# Patient Record
Sex: FEMALE | Race: WHITE | Employment: FULL TIME | URBAN - METROPOLITAN AREA
[De-identification: names, ages, dates, MRNs, and addresses within clinical notes are randomized per-mention and may not be internally consistent; named-entity substitution may affect disease eponyms.]

---

## 2017-01-02 ENCOUNTER — HOSPITAL ENCOUNTER (EMERGENCY)
Facility: CLINIC | Age: 52
Discharge: HOME | End: 2017-01-02
Attending: FAMILY MEDICINE | Admitting: FAMILY MEDICINE
Payer: COMMERCIAL

## 2017-01-02 VITALS
RESPIRATION RATE: 20 BRPM | WEIGHT: 222.4 LBS | BODY MASS INDEX: 38.49 KG/M2 | TEMPERATURE: 97 F | DIASTOLIC BLOOD PRESSURE: 100 MMHG | SYSTOLIC BLOOD PRESSURE: 170 MMHG | HEART RATE: 84 BPM | OXYGEN SATURATION: 99 %

## 2017-01-02 DIAGNOSIS — L03.113 CELLULITIS OF HAND, RIGHT: ICD-10-CM
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
## 2017-01-02 DIAGNOSIS — S61.451A CAT BITE OF HAND, RIGHT, INITIAL ENCOUNTER: ICD-10-CM

## 2017-01-02 PROCEDURE — 25000125 ZZHC RX 250: Performed by: FAMILY MEDICINE

## 2017-01-02 PROCEDURE — 96365 THER/PROPH/DIAG IV INF INIT: CPT

## 2017-01-02 PROCEDURE — 99284 EMERGENCY DEPT VISIT MOD MDM: CPT | Performed by: FAMILY MEDICINE

## 2017-01-02 PROCEDURE — 99284 EMERGENCY DEPT VISIT MOD MDM: CPT | Mod: 25

## 2017-01-02 RX ORDER — LIDOCAINE 40 MG/G
CREAM TOPICAL
Status: DISCONTINUED | OUTPATIENT
Start: 2017-01-02 | End: 2017-01-02 | Stop reason: HOSPADM

## 2017-01-02 RX ORDER — AMPICILLIN AND SULBACTAM 2; 1 G/1; G/1
3 INJECTION, POWDER, FOR SOLUTION INTRAMUSCULAR; INTRAVENOUS ONCE
Status: COMPLETED | OUTPATIENT
Start: 2017-01-02 | End: 2017-01-02

## 2017-01-02 RX ORDER — NAPROXEN SODIUM 220 MG
220 TABLET ORAL 3 TIMES DAILY PRN
Refills: 0 | COMMUNITY
Start: 2017-01-02 | End: 2017-01-06

## 2017-01-02 RX ADMIN — AMPICILLIN SODIUM AND SULBACTAM SODIUM 3 G: 2; 1 INJECTION, POWDER, FOR SOLUTION INTRAMUSCULAR; INTRAVENOUS at 11:11

## 2017-01-02 NOTE — ED PROVIDER NOTES
History     Chief Complaint   Patient presents with     Cat Bite     The history is provided by the patient.     Chandni Javier is a 51 year old female who presents to the ED with a cat bite. Patient was over at her mother's house when she saw there was stool on the bathroom floor from one of the cats. When she went to wipe it up her mom's cat attacked her right hand/arm. This occurred yesterday around 1100. This morning she woke up with redness and swelling to the area. The area is warm to the touch as well. The cat is up to date on his shots and is being observed in her mother's home.     Patient Active Problem List   Diagnosis     CARDIOVASCULAR SCREENING; LDL GOAL LESS THAN 130     Sepsis (H)     E. coli UTI     Ureteral stone with hydronephrosis - right, mild hydro     Acute renal failure (H)     Diabetes mellitus, type 2 (H)     Intermittent asthma (MILD)     Irritable bowel syndrome     Sepsis (H)     Acute pyelonephritis     Anemia     Past Medical History   Diagnosis Date     Migraines      GERD (gastroesophageal reflux disease)      Other and unspecified hyperlipidemia      Uncomplicated asthma      Diabetes (H)      Kidney stones        Past Surgical History   Procedure Laterality Date     Combined cystoscopy, retrogrades, ureteroscopy, insert stent  6/23/2014     Procedure: COMBINED CYSTOSCOPY, RETROGRADES, URETEROSCOPY, INSERT STENT;  Surgeon: Austin Landon MD;  Location: [facility] OR       Family History   Problem Relation Age of Onset     Breast Cancer Mother      HEART DISEASE Father      Lipids Father      Hypertension Father      Genitourinary Problems Brother      kidney stone       Social History   Substance Use Topics     Smoking status: Never Smoker      Smokeless tobacco: Never Used     Alcohol Use: No        Immunization History   Administered Date(s) Administered     TD (ADULT, 7+) 03/08/2002          Allergies   Allergen Reactions     Decadron [Dexamethasone] Rash     Versed  [Midazolam] Rash     Zofran [Ondansetron] Rash       Current Outpatient Prescriptions   Medication Sig Dispense Refill     VITAMIN E COMPLEX PO Take 1,000 Units by mouth daily       VITAMIN D, CHOLECALCIFEROL, PO Take 400 Units by mouth       METFORMIN HCL PO Take 500 mg by mouth 2 times daily (with meals)       acetaminophen (TYLENOL) 500 MG tablet Take 1-2 tablets (500-1,000 mg) by mouth every 6 hours as needed       calcium carbonate (TUMS) 500 MG chewable tablet Take 1 tablet (500 mg) by mouth 3 times daily as needed For heartburn 90 chew tab      ALBUTEROL INHALER 17GM Inhale 2 puffs into the lungs every 4 hours as needed For shortness of breath  0     pantoprazole (PROTONIX) 40 MG enteric coated tablet Take 1 tablet (40 mg) by mouth 2 times daily 30 tablet      ranitidine (ZANTAC) 150 MG tablet Take 1 tablet (150 mg) by mouth daily as needed for heartburn 60 tablet      Atorvastatin Calcium (LIPITOR PO) Take 40 mg by mouth daily. Indications: Type II A Hyperlipidemia       Ferrous Sulfate (IRON SUPPLEMENT PO) Take 650 mg by mouth At Bedtime        fexofenadine (ALLEGRA) 180 MG tablet Take 1 tablet by mouth daily as needed.       NASONEX 50 MCG/ACT NA SUSP AS NEEDED       I have reviewed the Medications, Allergies, Past Medical and Surgical History, and Social History in the Epic system.    Review of Systems   Musculoskeletal:        Positive for pain and swelling to right hand.    Skin:        Positive for redness to right hand. The area is warm to the touch.    All other systems reviewed and are negative.      Physical Exam   BP: (!) 163/102 mmHg  Heart Rate: 94  Temp: 97  F (36.1  C)  Resp: 20  Weight: 100.88 kg (222 lb 6.4 oz)  SpO2: 97 %  Physical Exam   Constitutional: She appears well-developed and well-nourished. No distress.   Musculoskeletal:        Right hand: She exhibits tenderness, laceration and swelling. She exhibits normal range of motion, no bony tenderness, normal capillary refill and no  deformity. Normal sensation noted. Normal strength noted.   Nursing note and vitals reviewed.                          ED Course   Procedures             Critical Care time:  none            Medications   ampicillin-sulbactam (UNASYN) 3 g vial to attach to  mL bag (not administered)   lidocaine 1 % 1 mL (not administered)   lidocaine (LMX4) kit (not administered)   sodium chloride (PF) 0.9% PF flush 3 mL (not administered)   sodium chloride (PF) 0.9% PF flush 3 mL (not administered)         Assessments & Plan (with Medical Decision Making)  Chandni Javier is a 51 year old female presenting with a cat bite to right hand that occurred yesterday morning around 1100.  She now has swelling, pain and redness to the area. She requests a initial IV antibiotic dose and then trial of oral medications. I think this is a reasonable request based on the exam. She was given a dose of Unasyn IV and started on Augmentin 875mg twice a day for 7 days. She agrees to be rechecked in 2 days in her clinic or to return to the ER should she have worsening symptoms. The area of inflammation was outlined with a skin marker.     I have reviewed the nursing notes.    I have reviewed the findings, diagnosis, plan and need for follow up with the patient.    Discharge Medication List as of 1/2/2017 12:15 PM      START taking these medications    Details   amoxicillin-clavulanate (AUGMENTIN) 875-125 MG per tablet Take 1 tablet by mouth 2 times daily for 7 days, Disp-14 tablet, R-0, E-Prescribe      naproxen sodium (ALEVE) 220 MG tablet Take 1 tablet (220 mg) by mouth 3 times daily as needed for moderate pain (take with food), R-0, OTC                I verbally discussed the findings of the evaluation today in the ER. I have verbally discussed with Chandni the suggested treatment(s) as described in the discharge instructions and handouts. I have prescribed the above listed medications and instructed her on appropriate use of these  medications.      I have verbally suggested she follow-up in her clinic or return to the ER for increased symptoms. See the follow-up recommendations documented  in the after visit summary in this visit's EPIC chart.      Final diagnoses:   Cellulitis of hand, right   Cat bite of hand, right, initial encounter   This document serves as a record of services personally performed by Bean Tompkins DO. It was created on their behalf by Lizbeth Mcfarland, a trained medical scribe. The creation of this record is based on the provider's personal observations and the statements of the patient. This document has been checked and approved by the attending provider.   Note: Chart documentation done in part with Dragon Voice Recognition software. Although reviewed after completion, some word and grammatical errors may remain.        1/2/2017   [facility] EMERGENCY DEPARTMENT      Bean Tompkins DO  01/02/17 7245

## 2017-01-02 NOTE — ED AVS SNAPSHOT
[facility] Emergency Department    [address] [place] [address]    Phone:  [phone]    Fax:  [phone]                                       Chandni Javier   MRN: [id number]    Department:  [facility] Emergency Department   Date of Visit:  1/2/2017           Patient Information     Date Of Birth          1965        Your diagnoses for this visit were:     Cellulitis of hand, right     Cat bite of hand, right, initial encounter        You were seen by Bean Tompkins DO.      Follow-up Information     Schedule an appointment as soon as possible for a visit with Rubens Calle    Specialty:  Pediatrics    Why:  for recheck in 2 days    Contact information:    [facility] [place]  [address]  [facility] [id number] [phone]          Follow up with [facility] Emergency Department.    Specialty:  EMERGENCY MEDICINE    Why:  If symptoms worsen    Contact information:    [address] [place] [address] [phone]    Additional information:    From [address]: Exit at [address] on south side of [place]. Turn right on [address]. Turn left at stoplight on [address]. [facility] will be in view two blocks ahead        Discharge Instructions       Please read and follow the handout(s) instructions. Return, if needed, for increased or worsening symptoms and as directed by the handout(s).    Yogurt orally twice a day while on the antibiotics may help prevent diarrhea and secondary infections caused by the antibiotic use.    Please make an appointment in your clinic for 2 days from now for recheck. Please return to the ER for increased symptoms of pain, redness, movement problems with the hand or fingers, fever above 102.    I hope you feel better soon!    Electronically signed, Bean Tompkins DO      Discharge References/Attachments     CAT BITE (ENGLISH)    SKIN INFECTION, CELLULITIS (ENGLISH)      24  Hour Appointment Hotline       To make an appointment at any [facility], call [phone] ([phone]). If you don't have a family doctor or clinic, we will help you find one. [facility] clinics are conveniently located to serve the needs of you and your family.             Review of your medicines      START taking        Dose / Directions Last dose taken    amoxicillin-clavulanate 875-125 MG per tablet   Commonly known as:  AUGMENTIN   Dose:  1 tablet   Quantity:  14 tablet        Take 1 tablet by mouth 2 times daily for 7 days   Refills:  0        naproxen sodium 220 MG tablet   Commonly known as:  ALEVE   Dose:  220 mg        Take 1 tablet (220 mg) by mouth 3 times daily as needed for moderate pain (take with food)   Refills:  0          Our records show that you are taking the medicines listed below. If these are incorrect, please call your family doctor or clinic.        Dose / Directions Last dose taken    acetaminophen 500 MG tablet   Commonly known as:  TYLENOL   Dose:  500-1000 mg        Take 1-2 tablets (500-1,000 mg) by mouth every 6 hours as needed   Refills:  0        ALBUTEROL INHALER 17GM   Dose:  2 puff        Inhale 2 puffs into the lungs every 4 hours as needed For shortness of breath   Refills:  0        calcium carbonate 500 MG chewable tablet   Commonly known as:  TUMS   Dose:  1 chew tab   Quantity:  90 chew tab        Take 1 tablet (500 mg) by mouth 3 times daily as needed For heartburn   Refills:  0        fexofenadine 180 MG tablet   Commonly known as:  ALLEGRA   Dose:  180 mg        Take 1 tablet by mouth daily as needed.   Refills:  0        IRON SUPPLEMENT PO   Dose:  650 mg        Take 650 mg by mouth At Bedtime   Refills:  0        LIPITOR PO   Dose:  40 mg   Indication:  Type II A Hyperlipidemia        Take 40 mg by mouth daily. Indications: Type II A Hyperlipidemia   Refills:  0        METFORMIN HCL PO   Dose:  500 mg        Take 500 mg by mouth 2 times daily (with meals)    Refills:  0        NASONEX 50 MCG/ACT spray   Generic drug:  mometasone        AS NEEDED   Refills:  0        PROTONIX 40 MG EC tablet   Dose:  40 mg   Quantity:  30 tablet   Generic drug:  pantoprazole        Take 1 tablet (40 mg) by mouth 2 times daily   Refills:  0        VITAMIN D (CHOLECALCIFEROL) PO   Dose:  400 Units        Take 400 Units by mouth   Refills:  0        VITAMIN E COMPLEX PO   Dose:  1000 Units        Take 1,000 Units by mouth daily   Refills:  0        ZANTAC 150 MG tablet   Dose:  150 mg   Quantity:  60 tablet   Generic drug:  ranitidine        Take 1 tablet (150 mg) by mouth daily as needed for heartburn   Refills:  0                Prescriptions were sent or printed at these locations (2 Prescriptions)                   [facility] [place], [place] - [address]    [address]  [place] [address]    Telephone:  [phone]   Fax:  [phone]   Hours:                  E-Prescribed (1 of 2)         amoxicillin-clavulanate (AUGMENTIN) 875-125 MG per tablet                 Not Printed or Sent (1 of 2)         naproxen sodium (ALEVE) 220 MG tablet                Orders Needing Specimen Collection     None      Pending Results     No orders found from 1/1/2017 to 1/3/2017.            Thank you for choosing [facility]       Thank you for choosing [facility] for your care. Our goal is always to provide you with excellent care. Hearing back from our patients is one way we can continue to improve our services. Please take a few minutes to complete the written survey that you may receive in the mail after you visit with us. Thank you!        [facility] Information     [employer] gives you secure access to your electronic health record. If you see a primary care provider, you can also send messages to your care team and make appointments. If you have questions, please call your primary care clinic.  If you do not have a primary care provider, please call [phone] and they will  assist you.        After Visit Summary       This is your record. Keep this with you and show to your community pharmacist(s) and doctor(s) at your next visit.

## 2017-01-02 NOTE — ED AVS SNAPSHOT
[facility] Emergency Department    [address] [place] [address]    Phone:  [phone]    Fax:  [phone]                                       Chandni Javier   MRN: [id number]    Department:  [facility] Emergency Department   Date of Visit:  1/2/2017           After Visit Summary Signature Page     I have received my discharge instructions, and my questions have been answered. I have discussed any challenges I see with this plan with the nurse or doctor.    ..........................................................................................................................................  Patient/Patient Representative Signature      ..........................................................................................................................................  Patient Representative Print Name and Relationship to Patient    ..................................................               ................................................  Date                                            Time    ..........................................................................................................................................  Reviewed by Signature/Title    ...................................................              ..............................................  Date                                                            Time

## 2017-01-02 NOTE — DISCHARGE INSTRUCTIONS
Please read and follow the handout(s) instructions. Return, if needed, for increased or worsening symptoms and as directed by the handout(s).    Yogurt orally twice a day while on the antibiotics may help prevent diarrhea and secondary infections caused by the antibiotic use.    Please make an appointment in your clinic for 2 days from now for recheck. Please return to the ER for increased symptoms of pain, redness, movement problems with the hand or fingers, fever above 102.    I hope you feel better soon!    Electronically signed, Bean Tompkins DO

## 2017-01-02 NOTE — ED NOTES
Comes in with a cat bite to the right hand, was bit yesterday at about 1100. Right hand red and swollen

## 2017-03-23 ENCOUNTER — HOSPITAL ENCOUNTER (EMERGENCY)
Facility: CLINIC | Age: 52
Discharge: HOME | End: 2017-03-23
Attending: FAMILY MEDICINE | Admitting: FAMILY MEDICINE
Payer: COMMERCIAL

## 2017-03-23 VITALS
SYSTOLIC BLOOD PRESSURE: 156 MMHG | RESPIRATION RATE: 20 BRPM | TEMPERATURE: 97 F | DIASTOLIC BLOOD PRESSURE: 89 MMHG | OXYGEN SATURATION: 99 % | HEART RATE: 108 BPM

## 2017-03-23 DIAGNOSIS — R68.89 INFLUENZA-LIKE SYMPTOMS: ICD-10-CM

## 2017-03-23 DIAGNOSIS — J98.01 BRONCHOSPASM: ICD-10-CM

## 2017-03-23 PROCEDURE — 99283 EMERGENCY DEPT VISIT LOW MDM: CPT | Performed by: FAMILY MEDICINE

## 2017-03-23 PROCEDURE — 99282 EMERGENCY DEPT VISIT SF MDM: CPT

## 2017-03-23 RX ORDER — OSELTAMIVIR PHOSPHATE 75 MG/1
75 CAPSULE ORAL 2 TIMES DAILY
Qty: 10 CAPSULE | Refills: 0 | Status: SHIPPED | OUTPATIENT
Start: 2017-03-23 | End: 2017-03-28

## 2017-03-23 RX ORDER — PREDNISONE 20 MG/1
TABLET ORAL
Qty: 7 TABLET | Refills: 0 | Status: ON HOLD | OUTPATIENT
Start: 2017-03-23 | End: 2018-06-12

## 2017-03-23 ASSESSMENT — ENCOUNTER SYMPTOMS
MYALGIAS: 1
FEVER: 0
ACTIVITY CHANGE: 1
CHEST TIGHTNESS: 1
WHEEZING: 1
CHILLS: 0
SHORTNESS OF BREATH: 1

## 2017-03-23 NOTE — DISCHARGE INSTRUCTIONS
Please read and follow the handout(s) instructions. Return, if needed, for increased or worsening symptoms and as directed by the handout(s).    Start the Prednisone course if you develop more severe wheezing that is not controlled with your inhaler.    I hope you feel better soon!    Electronically signed, Bean Tompkins DO

## 2017-03-23 NOTE — ED AVS SNAPSHOT
[facility] Emergency Department    [address] [place] [address]    Phone:  [phone]    Fax:  [phone]                                       Chandni Javier   MRN: [id number]    Department:  [facility] Emergency Department   Date of Visit:  3/23/2017           After Visit Summary Signature Page     I have received my discharge instructions, and my questions have been answered. I have discussed any challenges I see with this plan with the nurse or doctor.    ..........................................................................................................................................  Patient/Patient Representative Signature      ..........................................................................................................................................  Patient Representative Print Name and Relationship to Patient    ..................................................               ................................................  Date                                            Time    ..........................................................................................................................................  Reviewed by Signature/Title    ...................................................              ..............................................  Date                                                            Time

## 2017-03-23 NOTE — ED PROVIDER NOTES
History     Chief Complaint   Patient presents with     Flu Symptoms     HPI  Chandni Javier is a 51 year old female who presents to the ER with concerns about cough and nasal congestion symptoms. She states she was exposed to influenza has her daughter was diagnosed with influenza A on Tuesday this week. She states she is developed more harsh cough with some body aches and now is also having increased wheezing.  She used her albuterol inhaler through the night which did control the wheeze but she is concerned that she should initiate treatment for influenza sooner than later given her recent exposure.  She did receive the influenza immunization this fall.  She states her daughter was not immunized but will be this next year.      I have reviewed the Medications, Allergies, Past Medical and Surgical History, and Social History in the Epic system.  Patient Active Problem List   Diagnosis     CARDIOVASCULAR SCREENING; LDL GOAL LESS THAN 130     Sepsis (H)     E. coli UTI     Ureteral stone with hydronephrosis - right, mild hydro     Acute renal failure (H)     Diabetes mellitus, type 2 (H)     Intermittent asthma (MILD)     Irritable bowel syndrome     Sepsis (H)     Acute pyelonephritis     Anemia       Past Medical History:   Diagnosis Date     Diabetes (H)      GERD (gastroesophageal reflux disease)      Kidney stones      Migraines      Other and unspecified hyperlipidemia      Uncomplicated asthma         Past Surgical History:   Procedure Laterality Date     COMBINED CYSTOSCOPY, RETROGRADES, URETEROSCOPY, INSERT STENT  6/23/2014    Procedure: COMBINED CYSTOSCOPY, RETROGRADES, URETEROSCOPY, INSERT STENT;  Surgeon: Austin Landon MD;  Location:  OR       Family History   Problem Relation Age of Onset     Breast Cancer Mother      HEART DISEASE Father      Lipids Father      Hypertension Father      Genitourinary Problems Brother      kidney stone       Social History     Social History     Marital  status:      Spouse name: N/A     Number of children: N/A     Years of education: N/A     Occupational History     Not on file.     Social History Main Topics     Smoking status: Never Smoker     Smokeless tobacco: Never Used     Alcohol use No     Drug use: No     Sexual activity: Yes     Partners: Male     Other Topics Concern     Not on file     Social History Narrative       Current Outpatient Rx   Medication Sig Dispense Refill     oseltamivir (TAMIFLU) 75 MG capsule Take 1 capsule (75 mg) by mouth 2 times daily for 5 days 10 capsule 0     predniSONE (DELTASONE) 20 MG tablet 2 tabs daily for 2 days, then 1 tab daily for 3 days 7 tablet 0     VITAMIN E COMPLEX PO Take 1,000 Units by mouth daily       VITAMIN D, CHOLECALCIFEROL, PO Take 400 Units by mouth       METFORMIN HCL PO Take 500 mg by mouth 2 times daily (with meals)       acetaminophen (TYLENOL) 500 MG tablet Take 1-2 tablets (500-1,000 mg) by mouth every 6 hours as needed       calcium carbonate (TUMS) 500 MG chewable tablet Take 1 tablet (500 mg) by mouth 3 times daily as needed For heartburn 90 chew tab      ALBUTEROL INHALER 17GM Inhale 2 puffs into the lungs every 4 hours as needed For shortness of breath  0     pantoprazole (PROTONIX) 40 MG enteric coated tablet Take 1 tablet (40 mg) by mouth 2 times daily 30 tablet      ranitidine (ZANTAC) 150 MG tablet Take 1 tablet (150 mg) by mouth daily as needed for heartburn 60 tablet      Atorvastatin Calcium (LIPITOR PO) Take 40 mg by mouth daily. Indications: Type II A Hyperlipidemia       Ferrous Sulfate (IRON SUPPLEMENT PO) Take 650 mg by mouth At Bedtime        fexofenadine (ALLEGRA) 180 MG tablet Take 1 tablet by mouth daily as needed.       NASONEX 50 MCG/ACT NA SUSP AS NEEDED         Allergies   Allergen Reactions     Decadron [Dexamethasone] Rash     Versed [Midazolam] Rash     Zofran [Ondansetron] Rash       Immunization History   Administered Date(s) Administered     TD (ADULT, 7+)  03/08/2002     Review of Systems   Constitutional: Positive for activity change. Negative for chills and fever.   HENT: Positive for congestion.    Respiratory: Positive for chest tightness, shortness of breath (mild) and wheezing.    Musculoskeletal: Positive for myalgias.   All other systems reviewed and are negative.      Physical Exam   BP: 156/89  Pulse: 108  Temp: 97  F (36.1  C)  Resp: 20  SpO2: 99 %  Physical Exam   Constitutional: She is oriented to person, place, and time. She appears well-developed and well-nourished. She appears distressed (mild).   HENT:   Head: Normocephalic.   Eyes: Conjunctivae and EOM are normal. Pupils are equal, round, and reactive to light.   Neck: Normal range of motion. Neck supple.   Cardiovascular: Normal rate.    Pulmonary/Chest: Effort normal. No respiratory distress. She has wheezes. She has no rales. She exhibits no tenderness.   Musculoskeletal: Normal range of motion.   Neurological: She is alert and oriented to person, place, and time.   Skin: No rash noted.   Nursing note and vitals reviewed.      ED Course     ED Course     Procedures             Critical Care time:  none                 Assessments & Plan (with Medical Decision Making)  Given the above history I told her that we would just treat with Tamiflu to get it started sooner than later.  No influenza testing will be done today.  She is in agreement with this plan.  Encouraged to continue the albuterol inhaler use as needed for wheeze or cough.  If the wheezes not controlled with albuterol then to initiate the prednisone course.  She was instructed about the increased risks with influenza virus infections and also given handout instructions educating her on influenza and signs and symptoms of worsening condition and when to return to the emergency room.       I have reviewed the nursing notes.    I have reviewed the findings, diagnosis, plan and need for follow up with the patient.    New Prescriptions     OSELTAMIVIR (TAMIFLU) 75 MG CAPSULE    Take 1 capsule (75 mg) by mouth 2 times daily for 5 days    PREDNISONE (DELTASONE) 20 MG TABLET    2 tabs daily for 2 days, then 1 tab daily for 3 days          I verbally discussed the findings of the evaluation today in the ER. I have verbally discussed with Chandni the suggested treatment(s) as described in the discharge instructions and handouts. I have prescribed the above listed medications and instructed her on appropriate use of these medications.      I have verbally suggested she follow-up in her clinic or return to the ER for increased symptoms. See the follow-up recommendations documented  in the after visit summary in this visit's EPIC chart.      Final diagnoses:   Influenza-like symptoms   Bronchospasm       3/23/2017   [facility] EMERGENCY DEPARTMENT     Bean Tompkins DO  03/23/17 0731

## 2017-03-23 NOTE — ED AVS SNAPSHOT
[facility] Emergency Department    [address]     [place] [address]    Phone:  [phone]    Fax:  [phone]                                       Chandni Javier   MRN: [id number]    Department:  [facility] Emergency Department   Date of Visit:  3/23/2017           Patient Information     Date Of Birth          1965        Your diagnoses for this visit were:     Influenza-like symptoms     Bronchospasm        You were seen by Bean Tompkins DO.      Follow-up Information     Follow up with [facility]    Specialty:  Pediatrics    Why:  if not improved in 3-5 days    Contact information:    LISANDRO NICOLLET [place]  [address]  [facility] [id number] [phone]          Follow up with [facility] Emergency Department.    Specialty:  EMERGENCY MEDICINE    Why:  If symptoms worsen    Contact information:    [address]   [place] [address] [phone]    Additional information:    From [facility] [address]: Exit at [address] on south side of [place]. Turn right on [address]. Turn left at stoplight on [address]. [facility] will be in view two blocks ahead        Discharge Instructions       Please read and follow the handout(s) instructions. Return, if needed, for increased or worsening symptoms and as directed by the handout(s).    Start the Prednisone course if you develop more severe wheezing that is not controlled with your inhaler.    I hope you feel better soon!    Electronically signed, Bean Tompkins DO      Discharge References/Attachments     BRONCHOSPASM (ADULT) (ENGLISH)    INFLUENZA (ADULT) (ENGLISH)      24 Hour Appointment Hotline       To make an appointment at any [facility], call [phone] ([phone]). If you don't have a family doctor or clinic, we will help you find one. [facility] clinics are conveniently located to serve the needs of you and your family.             Review of  your medicines      START taking        Dose / Directions Last dose taken    oseltamivir 75 MG capsule   Commonly known as:  TAMIFLU   Dose:  75 mg   Quantity:  10 capsule        Take 1 capsule (75 mg) by mouth 2 times daily for 5 days   Refills:  0        predniSONE 20 MG tablet   Commonly known as:  DELTASONE   Quantity:  7 tablet        2 tabs daily for 2 days, then 1 tab daily for 3 days   Refills:  0          Our records show that you are taking the medicines listed below. If these are incorrect, please call your family doctor or clinic.        Dose / Directions Last dose taken    acetaminophen 500 MG tablet   Commonly known as:  TYLENOL   Dose:  500-1000 mg        Take 1-2 tablets (500-1,000 mg) by mouth every 6 hours as needed   Refills:  0        ALBUTEROL INHALER 17GM   Dose:  2 puff        Inhale 2 puffs into the lungs every 4 hours as needed For shortness of breath   Refills:  0        calcium carbonate 500 MG chewable tablet   Commonly known as:  TUMS   Dose:  1 chew tab   Quantity:  90 chew tab        Take 1 tablet (500 mg) by mouth 3 times daily as needed For heartburn   Refills:  0        fexofenadine 180 MG tablet   Commonly known as:  ALLEGRA   Dose:  180 mg        Take 1 tablet by mouth daily as needed.   Refills:  0        IRON SUPPLEMENT PO   Dose:  650 mg        Take 650 mg by mouth At Bedtime   Refills:  0        LIPITOR PO   Dose:  40 mg   Indication:  Type II A Hyperlipidemia        Take 40 mg by mouth daily. Indications: Type II A Hyperlipidemia   Refills:  0        METFORMIN HCL PO   Dose:  500 mg        Take 500 mg by mouth 2 times daily (with meals)   Refills:  0        NASONEX 50 MCG/ACT spray   Generic drug:  mometasone        AS NEEDED   Refills:  0        PROTONIX 40 MG EC tablet   Dose:  40 mg   Quantity:  30 tablet   Generic drug:  pantoprazole        Take 1 tablet (40 mg) by mouth 2 times daily   Refills:  0        VITAMIN D (CHOLECALCIFEROL) PO   Dose:  400 Units        Take 400  Units by mouth   Refills:  0        VITAMIN E COMPLEX PO   Dose:  1000 Units        Take 1,000 Units by mouth daily   Refills:  0        ZANTAC 150 MG tablet   Dose:  150 mg   Quantity:  60 tablet   Generic drug:  ranitidine        Take 1 tablet (150 mg) by mouth daily as needed for heartburn   Refills:  0                Prescriptions were sent or printed at these locations (2 Prescriptions)                   [facility]   [address] [address]    Telephone:  [phone]   Fax:  [phone]   Hours:                  Printed at Department/Unit printer (2 of 2)         oseltamivir (TAMIFLU) 75 MG capsule               predniSONE (DELTASONE) 20 MG tablet                Orders Needing Specimen Collection     None      Pending Results     No orders found from 3/21/2017 to 3/24/2017.            Pending Culture Results     No orders found from 3/21/2017 to 3/24/2017.            Thank you for choosing [facility]       Thank you for choosing [facility] for your care. Our goal is always to provide you with excellent care. Hearing back from our patients is one way we can continue to improve our services. Please take a few minutes to complete the written survey that you may receive in the mail after you visit with us. Thank you!        [employer] Information     [employer] gives you secure access to your electronic health record. If you see a primary care provider, you can also send messages to your care team and make appointments. If you have questions, please call your primary care clinic.  If you do not have a primary care provider, please call [phone] and they will assist you.        Care EveryWhere ID     This is your Care EveryWhere ID. This could be used by other organizations to access your [facility] medical records  [id number]        After Visit Summary       This is your record. Keep this with you and show to your community pharmacist(s) and doctor(s) at your next visit.

## 2017-10-08 ENCOUNTER — HEALTH MAINTENANCE LETTER (OUTPATIENT)
Age: 52
End: 2017-10-08

## 2018-06-12 ENCOUNTER — ANESTHESIA (OUTPATIENT)
Dept: SURGERY | Facility: CLINIC | Age: 53
End: 2018-06-12
Payer: COMMERCIAL

## 2018-06-12 ENCOUNTER — ANESTHESIA EVENT (OUTPATIENT)
Dept: SURGERY | Facility: CLINIC | Age: 53
End: 2018-06-12
Payer: COMMERCIAL

## 2018-06-12 ENCOUNTER — HOSPITAL ENCOUNTER (OUTPATIENT)
Facility: CLINIC | Age: 53
Discharge: HOME | End: 2018-06-12
Attending: FAMILY MEDICINE | Admitting: SPECIALIST
Payer: COMMERCIAL

## 2018-06-12 VITALS
TEMPERATURE: 97.6 F | DIASTOLIC BLOOD PRESSURE: 64 MMHG | SYSTOLIC BLOOD PRESSURE: 116 MMHG | BODY MASS INDEX: 37.9 KG/M2 | WEIGHT: 222 LBS | HEART RATE: 79 BPM | OXYGEN SATURATION: 98 % | RESPIRATION RATE: 14 BRPM | HEIGHT: 64 IN

## 2018-06-12 DIAGNOSIS — K61.1 PERIRECTAL ABSCESS: ICD-10-CM

## 2018-06-12 DIAGNOSIS — Z87.448 HISTORY OF PYELONEPHRITIS: ICD-10-CM

## 2018-06-12 DIAGNOSIS — N39.0 URINARY TRACT INFECTION WITHOUT HEMATURIA, SITE UNSPECIFIED: ICD-10-CM

## 2018-06-12 DIAGNOSIS — G89.18 POST-OP PAIN: Primary | ICD-10-CM

## 2018-06-12 DIAGNOSIS — E11.9 TYPE 2 DIABETES MELLITUS WITHOUT COMPLICATION, WITHOUT LONG-TERM CURRENT USE OF INSULIN (H): ICD-10-CM

## 2018-06-12 DIAGNOSIS — Z87.442 HISTORY OF KIDNEY STONES: ICD-10-CM

## 2018-06-12 DIAGNOSIS — Z86.19 HISTORY OF SEPSIS: ICD-10-CM

## 2018-06-12 LAB
ALBUMIN SERPL-MCNC: 3.6 G/DL (ref 3.4–5)
ALBUMIN UR-MCNC: 30 MG/DL
ALP SERPL-CCNC: 81 U/L (ref 40–150)
ALT SERPL W P-5'-P-CCNC: 45 U/L (ref 0–50)
ANION GAP SERPL CALCULATED.3IONS-SCNC: 9 MMOL/L (ref 3–14)
APPEARANCE UR: ABNORMAL
AST SERPL W P-5'-P-CCNC: 27 U/L (ref 0–45)
BACTERIA #/AREA URNS HPF: ABNORMAL /HPF
BASE EXCESS BLDV CALC-SCNC: 0.1 MMOL/L
BASOPHILS # BLD AUTO: 0 10E9/L (ref 0–0.2)
BASOPHILS NFR BLD AUTO: 0.4 %
BILIRUB SERPL-MCNC: 0.4 MG/DL (ref 0.2–1.3)
BILIRUB UR QL STRIP: ABNORMAL
BUN SERPL-MCNC: 14 MG/DL (ref 7–30)
CALCIUM SERPL-MCNC: 9 MG/DL (ref 8.5–10.1)
CHLORIDE SERPL-SCNC: 110 MMOL/L (ref 94–109)
CO2 SERPL-SCNC: 24 MMOL/L (ref 20–32)
COLOR UR AUTO: YELLOW
CREAT SERPL-MCNC: 0.79 MG/DL (ref 0.52–1.04)
CRP SERPL-MCNC: 64.8 MG/L (ref 0–8)
DIFFERENTIAL METHOD BLD: ABNORMAL
EOSINOPHIL NFR BLD AUTO: 3.3 %
ERYTHROCYTE [DISTWIDTH] IN BLOOD BY AUTOMATED COUNT: 12.4 % (ref 10–15)
GFR SERPL CREATININE-BSD FRML MDRD: 77 ML/MIN/1.7M2
GLUCOSE BLDC GLUCOMTR-MCNC: 102 MG/DL (ref 70–99)
GLUCOSE SERPL-MCNC: 108 MG/DL (ref 70–99)
GLUCOSE UR STRIP-MCNC: NEGATIVE MG/DL
HCO3 BLDV-SCNC: 25 MMOL/L (ref 21–28)
HCT VFR BLD AUTO: 38.4 % (ref 35–47)
HGB BLD-MCNC: 12.8 G/DL (ref 11.7–15.7)
HGB UR QL STRIP: NEGATIVE
IMM GRANULOCYTES # BLD: 0 10E9/L (ref 0–0.4)
IMM GRANULOCYTES NFR BLD: 0.3 %
KETONES UR STRIP-MCNC: NEGATIVE MG/DL
LACTATE BLD-SCNC: 1.4 MMOL/L (ref 0.7–2)
LEUKOCYTE ESTERASE UR QL STRIP: ABNORMAL
LYMPHOCYTES # BLD AUTO: 1.5 10E9/L (ref 0.8–5.3)
LYMPHOCYTES NFR BLD AUTO: 13.1 %
MCH RBC QN AUTO: 29.1 PG (ref 26.5–33)
MCHC RBC AUTO-ENTMCNC: 33.3 G/DL (ref 31.5–36.5)
MCV RBC AUTO: 87 FL (ref 78–100)
MONOCYTES # BLD AUTO: 1.1 10E9/L (ref 0–1.3)
MONOCYTES NFR BLD AUTO: 9.3 %
MUCOUS THREADS #/AREA URNS LPF: PRESENT /LPF
NEUTROPHILS # BLD AUTO: 8.4 10E9/L (ref 1.6–8.3)
NEUTROPHILS NFR BLD AUTO: 73.6 %
NITRATE UR QL: NEGATIVE
NRBC # BLD AUTO: 0 10*3/UL
NRBC BLD AUTO-RTO: 0 /100
O2/TOTAL GAS SETTING VFR VENT: 21 %
PCO2 BLDV: 38 MM HG (ref 40–50)
PH BLDV: 7.42 PH (ref 7.32–7.43)
PH UR STRIP: 5 PH (ref 5–7)
PLATELET # BLD AUTO: 270 10E9/L (ref 150–450)
PO2 BLDV: 126 MM HG (ref 25–47)
POTASSIUM SERPL-SCNC: 3.8 MMOL/L (ref 3.4–5.3)
PROT SERPL-MCNC: 6.7 G/DL (ref 6.8–8.8)
RBC # BLD AUTO: 4.4 10E12/L (ref 3.8–5.2)
RBC #/AREA URNS AUTO: <1 /HPF (ref 0–2)
SODIUM SERPL-SCNC: 143 MMOL/L (ref 133–144)
SOURCE: ABNORMAL
SP GR UR STRIP: 1.03 (ref 1–1.03)
SQUAMOUS #/AREA URNS AUTO: 7 /HPF (ref 0–1)
UROBILINOGEN UR STRIP-MCNC: 2 MG/DL (ref 0–2)
WBC # BLD AUTO: 11.4 10E9/L (ref 4–11)
WBC #/AREA URNS AUTO: 6 /HPF (ref 0–5)

## 2018-06-12 PROCEDURE — 25000125 ZZHC RX 250: Performed by: NURSE ANESTHETIST, CERTIFIED REGISTERED

## 2018-06-12 PROCEDURE — 80053 COMPREHEN METABOLIC PANEL: CPT | Performed by: FAMILY MEDICINE

## 2018-06-12 PROCEDURE — 25000125 ZZHC RX 250: Performed by: SPECIALIST

## 2018-06-12 PROCEDURE — 85025 COMPLETE CBC W/AUTO DIFF WBC: CPT | Performed by: FAMILY MEDICINE

## 2018-06-12 PROCEDURE — 25000128 H RX IP 250 OP 636: Performed by: FAMILY MEDICINE

## 2018-06-12 PROCEDURE — 99285 EMERGENCY DEPT VISIT HI MDM: CPT | Mod: 25 | Performed by: FAMILY MEDICINE

## 2018-06-12 PROCEDURE — 87075 CULTR BACTERIA EXCEPT BLOOD: CPT | Performed by: SPECIALIST

## 2018-06-12 PROCEDURE — 87086 URINE CULTURE/COLONY COUNT: CPT | Performed by: FAMILY MEDICINE

## 2018-06-12 PROCEDURE — 87070 CULTURE OTHR SPECIMN AEROBIC: CPT | Performed by: SPECIALIST

## 2018-06-12 PROCEDURE — 86140 C-REACTIVE PROTEIN: CPT | Performed by: FAMILY MEDICINE

## 2018-06-12 PROCEDURE — 82803 BLOOD GASES ANY COMBINATION: CPT | Performed by: FAMILY MEDICINE

## 2018-06-12 PROCEDURE — 81001 URINALYSIS AUTO W/SCOPE: CPT | Performed by: FAMILY MEDICINE

## 2018-06-12 PROCEDURE — 40000306 ZZH STATISTIC PRE PROC ASSESS II: Performed by: SPECIALIST

## 2018-06-12 PROCEDURE — 00000146 ZZHCL STATISTIC GLUCOSE BY METER IP

## 2018-06-12 PROCEDURE — 99284 EMERGENCY DEPT VISIT MOD MDM: CPT | Mod: Z6 | Performed by: FAMILY MEDICINE

## 2018-06-12 PROCEDURE — 37000009 ZZH ANESTHESIA TECHNICAL FEE, EACH ADDTL 15 MIN: Performed by: SPECIALIST

## 2018-06-12 PROCEDURE — 25000128 H RX IP 250 OP 636: Performed by: NURSE ANESTHETIST, CERTIFIED REGISTERED

## 2018-06-12 PROCEDURE — 37000008 ZZH ANESTHESIA TECHNICAL FEE, 1ST 30 MIN: Performed by: SPECIALIST

## 2018-06-12 PROCEDURE — 36000050 ZZH SURGERY LEVEL 2 1ST 30 MIN: Performed by: SPECIALIST

## 2018-06-12 PROCEDURE — 71000014 ZZH RECOVERY PHASE 1 LEVEL 2 FIRST HR: Performed by: SPECIALIST

## 2018-06-12 PROCEDURE — 99203 OFFICE O/P NEW LOW 30 MIN: CPT | Mod: 57 | Performed by: SPECIALIST

## 2018-06-12 PROCEDURE — 83605 ASSAY OF LACTIC ACID: CPT | Performed by: FAMILY MEDICINE

## 2018-06-12 PROCEDURE — 10060 I&D ABSCESS SIMPLE/SINGLE: CPT | Performed by: SPECIALIST

## 2018-06-12 PROCEDURE — 36000052 ZZH SURGERY LEVEL 2 EA 15 ADDTL MIN: Performed by: SPECIALIST

## 2018-06-12 PROCEDURE — 96360 HYDRATION IV INFUSION INIT: CPT | Mod: 59 | Performed by: FAMILY MEDICINE

## 2018-06-12 PROCEDURE — 87077 CULTURE AEROBIC IDENTIFY: CPT | Performed by: SPECIALIST

## 2018-06-12 PROCEDURE — 27210794 ZZH OR GENERAL SUPPLY STERILE: Performed by: SPECIALIST

## 2018-06-12 PROCEDURE — 71000027 ZZH RECOVERY PHASE 2 EACH 15 MINS: Performed by: SPECIALIST

## 2018-06-12 PROCEDURE — 87040 BLOOD CULTURE FOR BACTERIA: CPT | Performed by: FAMILY MEDICINE

## 2018-06-12 RX ORDER — PROMETHAZINE HYDROCHLORIDE 25 MG/ML
12.5 INJECTION, SOLUTION INTRAMUSCULAR; INTRAVENOUS
Status: DISCONTINUED | OUTPATIENT
Start: 2018-06-12 | End: 2018-06-12 | Stop reason: HOSPADM

## 2018-06-12 RX ORDER — FENTANYL CITRATE 50 UG/ML
25-50 INJECTION, SOLUTION INTRAMUSCULAR; INTRAVENOUS
Status: DISCONTINUED | OUTPATIENT
Start: 2018-06-12 | End: 2018-06-12 | Stop reason: HOSPADM

## 2018-06-12 RX ORDER — PROPOFOL 10 MG/ML
INJECTION, EMULSION INTRAVENOUS CONTINUOUS PRN
Status: DISCONTINUED | OUTPATIENT
Start: 2018-06-12 | End: 2018-06-12

## 2018-06-12 RX ORDER — ERTAPENEM 1 G/1
1 INJECTION, POWDER, LYOPHILIZED, FOR SOLUTION INTRAMUSCULAR; INTRAVENOUS ONCE
Status: COMPLETED | OUTPATIENT
Start: 2018-06-12 | End: 2018-06-12

## 2018-06-12 RX ORDER — BUPIVACAINE HYDROCHLORIDE AND EPINEPHRINE 2.5; 5 MG/ML; UG/ML
INJECTION, SOLUTION INFILTRATION; PERINEURAL PRN
Status: DISCONTINUED | OUTPATIENT
Start: 2018-06-12 | End: 2018-06-12 | Stop reason: HOSPADM

## 2018-06-12 RX ORDER — DIMENHYDRINATE 50 MG/ML
INJECTION, SOLUTION INTRAMUSCULAR; INTRAVENOUS PRN
Status: DISCONTINUED | OUTPATIENT
Start: 2018-06-12 | End: 2018-06-12

## 2018-06-12 RX ORDER — PROPOFOL 10 MG/ML
INJECTION, EMULSION INTRAVENOUS PRN
Status: DISCONTINUED | OUTPATIENT
Start: 2018-06-12 | End: 2018-06-12

## 2018-06-12 RX ORDER — HYDRALAZINE HYDROCHLORIDE 20 MG/ML
2.5-5 INJECTION INTRAMUSCULAR; INTRAVENOUS EVERY 10 MIN PRN
Status: DISCONTINUED | OUTPATIENT
Start: 2018-06-12 | End: 2018-06-12 | Stop reason: HOSPADM

## 2018-06-12 RX ORDER — HYDROMORPHONE HYDROCHLORIDE 1 MG/ML
.3-.5 INJECTION, SOLUTION INTRAMUSCULAR; INTRAVENOUS; SUBCUTANEOUS EVERY 10 MIN PRN
Status: DISCONTINUED | OUTPATIENT
Start: 2018-06-12 | End: 2018-06-12 | Stop reason: HOSPADM

## 2018-06-12 RX ORDER — OXYCODONE HYDROCHLORIDE 5 MG/1
5-10 TABLET ORAL
Status: DISCONTINUED | OUTPATIENT
Start: 2018-06-12 | End: 2018-06-12 | Stop reason: HOSPADM

## 2018-06-12 RX ORDER — FENTANYL CITRATE 50 UG/ML
INJECTION, SOLUTION INTRAMUSCULAR; INTRAVENOUS PRN
Status: DISCONTINUED | OUTPATIENT
Start: 2018-06-12 | End: 2018-06-12

## 2018-06-12 RX ORDER — SODIUM CHLORIDE, SODIUM LACTATE, POTASSIUM CHLORIDE, CALCIUM CHLORIDE 600; 310; 30; 20 MG/100ML; MG/100ML; MG/100ML; MG/100ML
INJECTION, SOLUTION INTRAVENOUS CONTINUOUS
Status: DISCONTINUED | OUTPATIENT
Start: 2018-06-12 | End: 2018-06-12 | Stop reason: HOSPADM

## 2018-06-12 RX ORDER — DIMENHYDRINATE 50 MG/ML
25 INJECTION, SOLUTION INTRAMUSCULAR; INTRAVENOUS
Status: DISCONTINUED | OUTPATIENT
Start: 2018-06-12 | End: 2018-06-12 | Stop reason: HOSPADM

## 2018-06-12 RX ORDER — MEPERIDINE HYDROCHLORIDE 25 MG/ML
12.5 INJECTION INTRAMUSCULAR; INTRAVENOUS; SUBCUTANEOUS
Status: DISCONTINUED | OUTPATIENT
Start: 2018-06-12 | End: 2018-06-12 | Stop reason: HOSPADM

## 2018-06-12 RX ORDER — SODIUM CHLORIDE 9 MG/ML
1000 INJECTION, SOLUTION INTRAVENOUS CONTINUOUS
Status: DISCONTINUED | OUTPATIENT
Start: 2018-06-12 | End: 2018-06-12 | Stop reason: HOSPADM

## 2018-06-12 RX ORDER — NALOXONE HYDROCHLORIDE 0.4 MG/ML
.1-.4 INJECTION, SOLUTION INTRAMUSCULAR; INTRAVENOUS; SUBCUTANEOUS
Status: DISCONTINUED | OUTPATIENT
Start: 2018-06-12 | End: 2018-06-12 | Stop reason: HOSPADM

## 2018-06-12 RX ORDER — LIDOCAINE HYDROCHLORIDE 20 MG/ML
INJECTION, SOLUTION INFILTRATION; PERINEURAL PRN
Status: DISCONTINUED | OUTPATIENT
Start: 2018-06-12 | End: 2018-06-12

## 2018-06-12 RX ORDER — ALBUTEROL SULFATE 0.83 MG/ML
2.5 SOLUTION RESPIRATORY (INHALATION) EVERY 4 HOURS PRN
Status: DISCONTINUED | OUTPATIENT
Start: 2018-06-12 | End: 2018-06-12 | Stop reason: HOSPADM

## 2018-06-12 RX ORDER — SODIUM CHLORIDE, SODIUM LACTATE, POTASSIUM CHLORIDE, CALCIUM CHLORIDE 600; 310; 30; 20 MG/100ML; MG/100ML; MG/100ML; MG/100ML
INJECTION, SOLUTION INTRAVENOUS CONTINUOUS PRN
Status: DISCONTINUED | OUTPATIENT
Start: 2018-06-12 | End: 2018-06-12

## 2018-06-12 RX ORDER — OXYCODONE HYDROCHLORIDE 5 MG/1
5-10 TABLET ORAL
Qty: 20 TABLET | Refills: 0 | Status: SHIPPED | OUTPATIENT
Start: 2018-06-12

## 2018-06-12 RX ORDER — LIDOCAINE HYDROCHLORIDE AND EPINEPHRINE 10; 10 MG/ML; UG/ML
INJECTION, SOLUTION INFILTRATION; PERINEURAL PRN
Status: DISCONTINUED | OUTPATIENT
Start: 2018-06-12 | End: 2018-06-12 | Stop reason: HOSPADM

## 2018-06-12 RX ORDER — OXYCODONE HYDROCHLORIDE 5 MG/1
5 TABLET ORAL
Status: DISCONTINUED | OUTPATIENT
Start: 2018-06-12 | End: 2018-06-12 | Stop reason: HOSPADM

## 2018-06-12 RX ORDER — SCOLOPAMINE TRANSDERMAL SYSTEM 1 MG/1
1 PATCH, EXTENDED RELEASE TRANSDERMAL
Status: COMPLETED | OUTPATIENT
Start: 2018-06-12 | End: 2018-06-12

## 2018-06-12 RX ADMIN — PROPOFOL 250 MCG/KG/MIN: 10 INJECTION, EMULSION INTRAVENOUS at 11:47

## 2018-06-12 RX ADMIN — SODIUM CHLORIDE: 9 INJECTION, SOLUTION INTRAVENOUS at 11:36

## 2018-06-12 RX ADMIN — DIMENHYDRINATE 25 MG: 50 INJECTION, SOLUTION INTRAMUSCULAR; INTRAVENOUS at 12:03

## 2018-06-12 RX ADMIN — SCOPALAMINE 1 PATCH: 1 PATCH, EXTENDED RELEASE TRANSDERMAL at 11:24

## 2018-06-12 RX ADMIN — LIDOCAINE HYDROCHLORIDE 100 MG: 20 INJECTION, SOLUTION INFILTRATION; PERINEURAL at 11:47

## 2018-06-12 RX ADMIN — FENTANYL CITRATE 50 MCG: 50 INJECTION, SOLUTION INTRAMUSCULAR; INTRAVENOUS at 11:36

## 2018-06-12 RX ADMIN — SODIUM CHLORIDE 1000 ML: 9 INJECTION, SOLUTION INTRAVENOUS at 09:09

## 2018-06-12 RX ADMIN — PROPOFOL 200 MG: 10 INJECTION, EMULSION INTRAVENOUS at 11:47

## 2018-06-12 RX ADMIN — FENTANYL CITRATE 50 MCG: 50 INJECTION, SOLUTION INTRAMUSCULAR; INTRAVENOUS at 11:41

## 2018-06-12 RX ADMIN — ERTAPENEM SODIUM 1 G: 1 INJECTION, POWDER, LYOPHILIZED, FOR SOLUTION INTRAMUSCULAR; INTRAVENOUS at 10:38

## 2018-06-12 RX ADMIN — SODIUM CHLORIDE, POTASSIUM CHLORIDE, SODIUM LACTATE AND CALCIUM CHLORIDE: 600; 310; 30; 20 INJECTION, SOLUTION INTRAVENOUS at 11:58

## 2018-06-12 RX ADMIN — SODIUM CHLORIDE, POTASSIUM CHLORIDE, SODIUM LACTATE AND CALCIUM CHLORIDE: 600; 310; 30; 20 INJECTION, SOLUTION INTRAVENOUS at 12:04

## 2018-06-12 ASSESSMENT — ENCOUNTER SYMPTOMS
EYE PAIN: 0
WEAKNESS: 1
HEMATURIA: 0
FREQUENCY: 0
BLOOD IN STOOL: 0
FEVER: 1
CONFUSION: 0
SORE THROAT: 0
VOMITING: 1
DYSURIA: 0
NAUSEA: 1
COUGH: 0
ABDOMINAL PAIN: 0
CONSTIPATION: 0
NERVOUS/ANXIOUS: 0
CHILLS: 1
BACK PAIN: 0
LIGHT-HEADEDNESS: 1
CHEST TIGHTNESS: 0
SHORTNESS OF BREATH: 0
POLYDIPSIA: 0
DIARRHEA: 0
APPETITE CHANGE: 1

## 2018-06-12 NOTE — CONSULTS
[facility] Emergency Department Surgery Consult    Chandni Javier MRN# [id number]   Age: 52 year old YOB: 1965     Date of Admission:  6/12/2018    Reason for consult: Perirectal abscess       Requesting physician: Dr. Lindsay       Level of consult: Consult, follow and place orders           Impression and Plan:   Impression:   1) Perirectal vs buttock abscess  2) UTI      Plan:   Rectal exam exam under anesthesia, incision drainage of abscess, possible seton.  The procedure, risks, benefits were discussed and she agrees to proceed.  She will also be sent home on an abx for her UTI           Chief Complaint:   Left buttock pain since Sunday     History is obtained from the patient         History of Present Illness:   This 52 year old female who developed left buttock pain on Sunday.  The area slowly hardened on Monday.  The patient attempted self drainage with direct pressure.  It enlarged again and she presented to the ER today.  She was thought to have a perirectal abscess for which I am asked to see her.  Denies any fever, chills.  Her BS are under control but she did have some vomiting this am.  Had some diarrhea Saturday but that resolved.  Her last colonoscopy was in March and she reports it as normal.   I am now asked to see her a possible perirectal abscess             Past Medical History:     Past Medical History:   Diagnosis Date     Diabetes (H)      GERD (gastroesophageal reflux disease)      Kidney stones      Migraines      Motion sickness      Other and unspecified hyperlipidemia      Uncomplicated asthma              Past Surgical History:     Past Surgical History:   Procedure Laterality Date     COMBINED CYSTOSCOPY, RETROGRADES, URETEROSCOPY, INSERT STENT  6/23/2014    Procedure: COMBINED CYSTOSCOPY, RETROGRADES, URETEROSCOPY, INSERT STENT;  Surgeon: Austin Landon MD;  Location:  OR             Social History:     Social History   Substance Use Topics      Smoking status: Never Smoker     Smokeless tobacco: Never Used     Alcohol use No             Family History:     Family History   Problem Relation Age of Onset     Breast Cancer Mother      HEART DISEASE Father      Lipids Father      Hypertension Father      Genitourinary Problems Brother      kidney stone              Allergies:     Allergies   Allergen Reactions     Decadron [Dexamethasone] Rash     Versed [Midazolam] Rash     Zofran [Ondansetron] Rash             Medications:     Current Facility-Administered Medications   Medication     Provider ordered ALTERNATE pre op antibiotic.     scopolamine (TRANSDERM-SCOP) Patch in Place    And     [START ON 6/15/2018] scopolamine (TRANSDERM-SCOP) patch REMOVAL     sodium chloride 0.9% infusion             Review of Systems:   The review of systems was positive for the following findings.  Left buttock.  The remainder of the review of systems was unremarkable.          Physical Exam:   PE:  B/P: 137/77, T: 98.3, P: 79, R: 16  General: well developed, well nourished WF who appears her stated age  HEENT: NC/AT, EOMI, (-)icterus, (-)injection  Neck: Supple, No JVD  Chest: CTA  Heart: S1, S2, (-)m/r/g  Abd: Soft, non tender, non distended  Rectal:  Tender indurated area left buttock 4cm lateral to rectum, central area of fluctuance.  3x3x4 cm in size.  Ext; Warm, no edema  Psych: AAOx3  Neuro: No focal deficits            Data:   All laboratory data reviewed  Results for orders placed or performed during the hospital encounter of 06/12/18 (from the past 24 hour(s))   CBC with platelets differential   Result Value Ref Range    WBC 11.4 (H) 4.0 - 11.0 10e9/L    RBC Count 4.40 3.8 - 5.2 10e12/L    Hemoglobin 12.8 11.7 - 15.7 g/dL    Hematocrit 38.4 35.0 - 47.0 %    MCV 87 78 - 100 fl    MCH 29.1 26.5 - 33.0 pg    MCHC 33.3 31.5 - 36.5 g/dL    RDW 12.4 10.0 - 15.0 %    Platelet Count 270 150 - 450 10e9/L    Diff Method Automated Method     % Neutrophils 73.6 %    % Lymphocytes  13.1 %    % Monocytes 9.3 %    % Eosinophils 3.3 %    % Basophils 0.4 %    % Immature Granulocytes 0.3 %    Nucleated RBCs 0 0 /100    Absolute Neutrophil 8.4 (H) 1.6 - 8.3 10e9/L    Absolute Lymphocytes 1.5 0.8 - 5.3 10e9/L    Absolute Monocytes 1.1 0.0 - 1.3 10e9/L    Absolute Basophils 0.0 0.0 - 0.2 10e9/L    Abs Immature Granulocytes 0.0 0 - 0.4 10e9/L    Absolute Nucleated RBC 0.0    Comprehensive metabolic panel   Result Value Ref Range    Sodium 143 133 - 144 mmol/L    Potassium 3.8 3.4 - 5.3 mmol/L    Chloride 110 (H) 94 - 109 mmol/L    Carbon Dioxide 24 20 - 32 mmol/L    Anion Gap 9 3 - 14 mmol/L    Glucose 108 (H) 70 - 99 mg/dL    Urea Nitrogen 14 7 - 30 mg/dL    Creatinine 0.79 0.52 - 1.04 mg/dL    GFR Estimate 77 >60 mL/min/1.7m2    GFR Estimate If Black >90 >60 mL/min/1.7m2    Calcium 9.0 8.5 - 10.1 mg/dL    Bilirubin Total 0.4 0.2 - 1.3 mg/dL    Albumin 3.6 3.4 - 5.0 g/dL    Protein Total 6.7 (L) 6.8 - 8.8 g/dL    Alkaline Phosphatase 81 40 - 150 U/L    ALT 45 0 - 50 U/L    AST 27 0 - 45 U/L   CRP inflammation   Result Value Ref Range    CRP Inflammation 64.8 (H) 0.0 - 8.0 mg/L   Lactic acid whole blood   Result Value Ref Range    Lactic Acid 1.4 0.7 - 2.0 mmol/L   Blood gas venous   Result Value Ref Range    Ph Venous 7.42 7.32 - 7.43 pH    PCO2 Venous 38 (L) 40 - 50 mm Hg    PO2 Venous 126 (H) 25 - 47 mm Hg    Bicarbonate Venous 25 21 - 28 mmol/L    Base Excess Venous 0.1 mmol/L    FIO2 21    UA with Microscopic   Result Value Ref Range    Color Urine Yellow     Appearance Urine Slightly Cloudy     Glucose Urine Negative NEG^Negative mg/dL    Bilirubin Urine Small (A) NEG^Negative    Ketones Urine Negative NEG^Negative mg/dL    Specific Gravity Urine 1.027 1.003 - 1.035    Blood Urine Negative NEG^Negative    pH Urine 5.0 5.0 - 7.0 pH    Protein Albumin Urine 30 (A) NEG^Negative mg/dL    Urobilinogen mg/dL 2.0 0.0 - 2.0 mg/dL    Nitrite Urine Negative NEG^Negative    Leukocyte Esterase Urine Moderate  (A) NEG^Negative    Source Unspecified Urine     WBC Urine 6 (H) 0 - 5 /HPF    RBC Urine <1 0 - 2 /HPF    Bacteria Urine Few (A) NEG^Negative /HPF    Squamous Epithelial /HPF Urine 7 (H) 0 - 1 /HPF    Mucous Urine Present (A) NEG^Negative /LPF     No imaging today     Tony Roberts MD, FACS

## 2018-06-12 NOTE — ANESTHESIA PREPROCEDURE EVALUATION
Anesthesia Evaluation     . Pt has had prior anesthetic. Type: General           ROS/MED HX    ENT/Pulmonary:     (+)Intermittent asthma Treatment: Inhaler prn,  , . .    Neurologic:  - neg neurologic ROS   (+)migraines,     Cardiovascular:  - neg cardiovascular ROS       METS/Exercise Tolerance:     Hematologic:  - neg hematologic  ROS       Musculoskeletal:  - neg musculoskeletal ROS       GI/Hepatic:     (+) GERD Asymptomatic on medication,       Renal/Genitourinary: Comment: Right kidney stone    (+) chronic renal disease, type: CRI, Nephrolithiasis , Pt does not require dialysis, Pt has no history of transplant, Other Renal/ Genitourinary, right pyelonephritis / hydronephrosis      Endo:     (+) type II DM Last HgA1c: 5.0 date: 6/21/2014 Not using insulin - not using insulin pump Normal glucose range: 100-110's not previously admitted for DM/DKA Obesity, .      Psychiatric:  - neg psychiatric ROS       Infectious Disease:  - neg infectious disease ROS       Malignancy:      - no malignancy   Other:    (+) No chance of pregnancy                    Physical Exam  Normal systems: cardiovascular, pulmonary and dental    Airway   Mallampati: II  TM distance: >3 FB  Neck ROM: full    Dental     Cardiovascular   Rhythm and rate: regular and normal      Pulmonary    breath sounds clear to auscultation                    Anesthesia Plan      History & Physical Review  History and physical reviewed and following examination; no interval change.    ASA Status:  3 .    NPO Status:  > 6 hours    Plan for General and LMA with Propofol induction. Maintenance will be Balanced.    PONV prophylaxis:  Meclizine or Dimenhydrinate       Postoperative Care  Postoperative pain management:  IV analgesics.      Consents  Anesthetic plan, risks, benefits and alternatives discussed with:  Patient.  Use of blood products discussed: No .   .                          .

## 2018-06-12 NOTE — ED PROVIDER NOTES
History     Chief Complaint   Patient presents with     Wound Check     HPI  Chandni Javier is a 52 year old female with history of diabetes, sepsis, pyelonephritis, acute renal failure, history of kidney stone, mild intermittent asthma, GERD, migraines, and irritable bowel who presents to the emergency room with a large area of swelling in her left buttock cheek next to her rectum. Patient denies any history of ulcerative colitis or Crohn's disease. No prior history of perirectal abscess. She does have a history of cutaneous abscess. She had an episode of acute pyelonephritis in 2014 which also resulted in gram-negative sepsis. She feels ill, feverish, chills and nauseated. She had one emesis this morning.    Problem List:    Patient Active Problem List    Diagnosis Date Noted     Acute pyelonephritis 06/22/2014     Priority: Medium     Anemia 06/22/2014     Priority: Medium     Sepsis (H) 06/21/2014     Priority: Medium     Problem list name updated by automated process. Provider to review       E. coli UTI 06/21/2014     Priority: Medium     Ureteral stone with hydronephrosis - right, mild hydro 06/21/2014     Priority: Medium     Acute renal failure (H) 06/21/2014     Priority: Medium     Diabetes mellitus, type 2 (H) 06/21/2014     Priority: Medium     Problem list name updated by automated process. Provider to review       Intermittent asthma (MILD) 06/21/2014     Priority: Medium     Irritable bowel syndrome 06/21/2014     Priority: Medium     Sepsis (H) 06/21/2014     Priority: Medium     CARDIOVASCULAR SCREENING; LDL GOAL LESS THAN 130 10/31/2010     Priority: Medium        Past Medical History:    Past Medical History:   Diagnosis Date     Diabetes (H)      GERD (gastroesophageal reflux disease)      Kidney stones      Migraines      Other and unspecified hyperlipidemia      Uncomplicated asthma        Past Surgical History:    Past Surgical History:   Procedure Laterality Date     COMBINED  CYSTOSCOPY, RETROGRADES, URETEROSCOPY, INSERT STENT  6/23/2014    Procedure: COMBINED CYSTOSCOPY, RETROGRADES, URETEROSCOPY, INSERT STENT;  Surgeon: Austin Landon MD;  Location:  OR       Family History:    Family History   Problem Relation Age of Onset     Breast Cancer Mother      HEART DISEASE Father      Lipids Father      Hypertension Father      Genitourinary Problems Brother      kidney stone       Social History:  Marital Status:   [2]  Social History   Substance Use Topics     Smoking status: Never Smoker     Smokeless tobacco: Never Used     Alcohol use No        Medications:      acetaminophen (TYLENOL) 500 MG tablet   ALBUTEROL INHALER 17GM   Atorvastatin Calcium (LIPITOR PO)   calcium carbonate (TUMS) 500 MG chewable tablet   Ferrous Sulfate (IRON SUPPLEMENT PO)   fexofenadine (ALLEGRA) 180 MG tablet   METFORMIN HCL PO   NASONEX 50 MCG/ACT NA SUSP   pantoprazole (PROTONIX) 40 MG enteric coated tablet   predniSONE (DELTASONE) 20 MG tablet   ranitidine (ZANTAC) 150 MG tablet   VITAMIN D, CHOLECALCIFEROL, PO   VITAMIN E COMPLEX PO         Review of Systems   Constitutional: Positive for appetite change, chills and fever.   HENT: Negative for congestion and sore throat.    Eyes: Negative for pain and visual disturbance.   Respiratory: Negative for cough, chest tightness and shortness of breath.    Cardiovascular: Negative for chest pain.   Gastrointestinal: Positive for nausea and vomiting. Negative for abdominal pain, blood in stool, constipation and diarrhea.   Endocrine: Negative for polydipsia and polyuria.   Genitourinary: Negative for dysuria, frequency and hematuria.   Musculoskeletal: Negative for back pain.   Skin: Negative for rash.   Neurological: Positive for weakness and light-headedness.   Psychiatric/Behavioral: Negative for confusion. The patient is not nervous/anxious.        Physical Exam   BP: (!) 153/107  Pulse: 90  Temp: 97.6  F (36.4  C)  Resp: 16  Height: 162.6 cm (5'  "4\")  Weight: 100.7 kg (222 lb)  SpO2: 99 %      Physical Exam   Constitutional: She is oriented to person, place, and time. She appears well-developed and well-nourished. No distress.   HENT:   Head: Normocephalic and atraumatic.   Right Ear: External ear normal.   Left Ear: External ear normal.   Nose: Nose normal.   Mouth/Throat: Oropharynx is clear and moist.   Eyes: Conjunctivae are normal.   Neck: Normal range of motion.   Cardiovascular: Normal rate, regular rhythm and normal heart sounds.    Pulmonary/Chest: Effort normal.   Abdominal: Soft.   Genitourinary:         Genitourinary Comments: Larger than golfball sized area of swelling in the right cheek-perirectal with redness warmth and tenderness consistent with perirectal abscess   Musculoskeletal: Normal range of motion.   Neurological: She is alert and oriented to person, place, and time.   Skin: Skin is warm and dry.   Psychiatric: She has a normal mood and affect.   Nursing note and vitals reviewed.      ED Course     ED Course         Procedures               Critical Care time:  none               Results for orders placed or performed during the hospital encounter of 06/12/18 (from the past 24 hour(s))   CBC with platelets differential   Result Value Ref Range    WBC 11.4 (H) 4.0 - 11.0 10e9/L    RBC Count 4.40 3.8 - 5.2 10e12/L    Hemoglobin 12.8 11.7 - 15.7 g/dL    Hematocrit 38.4 35.0 - 47.0 %    MCV 87 78 - 100 fl    MCH 29.1 26.5 - 33.0 pg    MCHC 33.3 31.5 - 36.5 g/dL    RDW 12.4 10.0 - 15.0 %    Platelet Count 270 150 - 450 10e9/L    Diff Method Automated Method     % Neutrophils 73.6 %    % Lymphocytes 13.1 %    % Monocytes 9.3 %    % Eosinophils 3.3 %    % Basophils 0.4 %    % Immature Granulocytes 0.3 %    Nucleated RBCs 0 0 /100    Absolute Neutrophil 8.4 (H) 1.6 - 8.3 10e9/L    Absolute Lymphocytes 1.5 0.8 - 5.3 10e9/L    Absolute Monocytes 1.1 0.0 - 1.3 10e9/L    Absolute Basophils 0.0 0.0 - 0.2 10e9/L    Abs Immature Granulocytes 0.0 0 - " 0.4 10e9/L    Absolute Nucleated RBC 0.0    Comprehensive metabolic panel   Result Value Ref Range    Sodium 143 133 - 144 mmol/L    Potassium 3.8 3.4 - 5.3 mmol/L    Chloride 110 (H) 94 - 109 mmol/L    Carbon Dioxide 24 20 - 32 mmol/L    Anion Gap 9 3 - 14 mmol/L    Glucose 108 (H) 70 - 99 mg/dL    Urea Nitrogen 14 7 - 30 mg/dL    Creatinine 0.79 0.52 - 1.04 mg/dL    GFR Estimate 77 >60 mL/min/1.7m2    GFR Estimate If Black >90 >60 mL/min/1.7m2    Calcium 9.0 8.5 - 10.1 mg/dL    Bilirubin Total 0.4 0.2 - 1.3 mg/dL    Albumin 3.6 3.4 - 5.0 g/dL    Protein Total 6.7 (L) 6.8 - 8.8 g/dL    Alkaline Phosphatase 81 40 - 150 U/L    ALT 45 0 - 50 U/L    AST 27 0 - 45 U/L   CRP inflammation   Result Value Ref Range    CRP Inflammation 64.8 (H) 0.0 - 8.0 mg/L   Lactic acid whole blood   Result Value Ref Range    Lactic Acid 1.4 0.7 - 2.0 mmol/L   Blood gas venous   Result Value Ref Range    Ph Venous 7.42 7.32 - 7.43 pH    PCO2 Venous 38 (L) 40 - 50 mm Hg    PO2 Venous 126 (H) 25 - 47 mm Hg    Bicarbonate Venous 25 21 - 28 mmol/L    Base Excess Venous 0.1 mmol/L    FIO2 21    UA with Microscopic   Result Value Ref Range    Color Urine Yellow     Appearance Urine Slightly Cloudy     Glucose Urine Negative NEG^Negative mg/dL    Bilirubin Urine Small (A) NEG^Negative    Ketones Urine Negative NEG^Negative mg/dL    Specific Gravity Urine 1.027 1.003 - 1.035    Blood Urine Negative NEG^Negative    pH Urine 5.0 5.0 - 7.0 pH    Protein Albumin Urine 30 (A) NEG^Negative mg/dL    Urobilinogen mg/dL 2.0 0.0 - 2.0 mg/dL    Nitrite Urine Negative NEG^Negative    Leukocyte Esterase Urine Moderate (A) NEG^Negative    Source Unspecified Urine     WBC Urine 6 (H) 0 - 5 /HPF    RBC Urine <1 0 - 2 /HPF    Bacteria Urine Few (A) NEG^Negative /HPF    Squamous Epithelial /HPF Urine 7 (H) 0 - 1 /HPF    Mucous Urine Present (A) NEG^Negative /LPF       Medications   0.9% sodium chloride BOLUS (0 mLs Intravenous Stopped 6/12/18 1025)     Followed by    sodium chloride 0.9% infusion (not administered)   ertapenem (INVanz) 1 g vial to attach to  mL bag (1 g Intravenous New Bag 6/12/18 1038)       Assessments & Plan (with Medical Decision Making)   MDM--52-year-old female diabetic who presents to emergency room with a perirectal abscess. She has been mildly ill with this feeling feverish and nauseated and threw up last night. She has a slightly elevated white count at 11.4 but a normal lactate. CRP elevated at 64.8 her urine is a dirty specimen but does have 6 white cells. She is diabetic but her blood sugar is 108. She is usually under excellent control with a hemoglobin A1c of 5. On examination she has a larger than golfball size is left perirectal abscess. It is red and warm and very tender to the touch. There is no drainage noted. Given her diabetes, feeling ill and elevated white count and CRP I consulted surgery for I&D. Dr. Roberts will take her to the OR at 11:30 today. She has been well recently other than this. She has no contraindications to anesthesia or surgery. She has been n.p.o. since last night. She had 60 cc of liquid this morning to take her medications and nothing since. We will give her 1 L of normal saline and 1 g of Invanz preoperatively. I discussed all the patient's laboratory test and findings and plan for surgical incision and drainage with the patient and her  and they are comfortable with this evaluation and treatment plan. The patient is a knowledgeable nurse. She has no further questions at this time. Further care transferred to .     This note may serve as a preoperative H&P        I have reviewed the nursing notes.    I have reviewed the findings, diagnosis, plan and need for follow up with the patient.          New Prescriptions    No medications on file       Final diagnoses:   Perirectal abscess   Type 2 diabetes mellitus without complication, without long-term current use of insulin (H)   History of sepsis    History of kidney stones   History of pyelonephritis       6/12/2018   [facility] EMERGENCY DEPARTMENT     Angélica, Vamshi PRATER MD  06/12/18 0549

## 2018-06-12 NOTE — BRIEF OP NOTE
[facility] Brief Operative Note    Pre-operative diagnosis: carter rectal abscess   Post-operative diagnosis Left buttock abscess   Procedure: 1) Rectal exam under anesthesia  2) Incision and drainage of buttock abscess   Surgeon: Tony Roberts MD, FACS   Assistants(s): None   Estimated blood loss: Minimal    Specimens: Cultures   Findings: 3x3x4 cm indurated area left buttock.  No communication to rectum.  Small amt of pus.     Tony Roberts MD, FACS    #[id number]

## 2018-06-12 NOTE — ANESTHESIA CARE TRANSFER NOTE
Patient: Chandni Javier    Procedure(s):  excision of perirectal abscess - Wound Class: II-Clean Contaminated    Diagnosis: carter rectal abscess  Diagnosis Additional Information: No value filed.    Anesthesia Type:   General, LMA     Note:  Airway :Face Mask  Patient transferred to:PACU  Handoff Report: Identifed the Patient, Identified the Reponsible Provider, Reviewed the pertinent medical history, Discussed the surgical course, Reviewed Intra-OP anesthesia mangement and issues during anesthesia, Set expectations for post-procedure period and Allowed opportunity for questions and acknowledgement of understanding      Vitals: (Last set prior to Anesthesia Care Transfer)    CRNA VITALS  6/12/2018 1152 - 6/12/2018 1227      6/12/2018             Pulse: 86    SpO2: 96 %    Resp Rate (observed): 9                Electronically Signed By: VARUN Catalan CRNA  June 12, 2018  12:27 PM

## 2018-06-12 NOTE — ANESTHESIA POSTPROCEDURE EVALUATION
Patient: Chandni Javier    Procedure(s):  excision of perirectal abscess - Wound Class: II-Clean Contaminated    Diagnosis:carter rectal abscess  Diagnosis Additional Information: No value filed.    Anesthesia Type:  General, LMA    Note:  Anesthesia Post Evaluation    Patient location during evaluation: Phase 2  Patient participation: Able to fully participate in evaluation  Level of consciousness: awake  Pain management: adequate  Airway patency: patent  Cardiovascular status: acceptable and hemodynamically stable  Respiratory status: acceptable, room air and nonlabored ventilation  Hydration status: stable  PONV: none     Anesthetic complications: None    Comments: Patient was happy with the anesthesia care received and no anesthesia related complications were noted.  I will follow up with the patient again if it is needed.        Last vitals:  Vitals:    06/12/18 1255 06/12/18 1300 06/12/18 1330   BP: 129/82 122/85 95/65   Pulse:      Resp: 14 14    Temp:  97.6  F (36.4  C)    SpO2: 98% 96% 96%         Electronically Signed By: VARUN Catalan CRNA  June 12, 2018  1:55 PM

## 2018-06-12 NOTE — ED NOTES
Surgery charge called and said plan is to take her at 1130.    She had about 60cc's of Coke about 0700 today.  Nothing solid since last night.

## 2018-06-12 NOTE — IP AVS SNAPSHOT
MRN:[id number]                      After Visit Summary   6/12/2018    Chandni Javier    MRN: [id number]           Thank you!     Thank you for choosing [facility] for your care. Our goal is always to provide you with excellent care. Hearing back from our patients is one way we can continue to improve our services. Please take a few minutes to complete the written survey that you may receive in the mail after you visit with us. Thank you!        Patient Information     Date Of Birth          1965        About your hospital stay     You were admitted on:  N/A You last received care in the:  [facility] Phase II    You were discharged on:  June 12, 2018       Who to Call     For medical emergencies, please call 911.  For non-urgent questions about your medical care, please call your primary care provider or clinic, [phone]  For questions related to your surgery, please call your surgery clinic        Attending Provider     Provider Specialty    Angélica, Vamshi PRATER MD Emergency Medicine       Primary Care Provider Office Phone # Fax #    Rubens Calle [phone]      After Care Instructions     Diet Instructions       Resume pre-procedure diet            Discharge Instructions       Patient to follow up with appointment in 1 week            Dressing       Keep dressing clean and dry.  Dressing / incisional care as instructed by RN and or Surgeon            Ice to affected area       Ice to operative site PRN            No Alcohol       For 24 hours post procedure            No driving or operating machinery        until the day after procedure            Shower       No shower for 24 hours post procedure. May shower Postoperative Day (POD) 1.   Remove packing in the am in shower or bath.   Then start BID sitz baths. Wash wound with soap and water then pack lightly with gauze 2x a day and as necessary.                  Your next 10 appointments already scheduled     Timothy  18, 2018  2:30 PM CDT   [facility] with Tony Roberts MD   [facility]    [address] [place] [address] [phone]              Further instructions from your care team       DISCHARGE INSTRUCTIONS FOR PATIENT   SCOPOLAMINE TRANSDERMAL PATCH  You may leave the patch on behind your ear for three days, but NO LONGER. You may have withdrawal symptoms (nausea, vomiting, headache, dizziness) if used longer.  When you remove the patch, you may wash and dry your hands thoroughly and before touching your eyes, as pupil may dilate.  Discard patch (away from children and pets).  You may develop some urinary hesitancy or urine retention.    Same-Day Surgery   Adult Discharge Orders & Instructions - After Anesthesia    For 24 hours after surgery    1. Get plenty of rest.  A responsible adult must stay with you for at least 24 hours after you leave the hospital.   2. Do not drive or use heavy equipment.  If you have weakness or tingling, don't drive or use heavy equipment until this feeling goes away.  3. Do not drink alcohol.  4. Avoid strenuous or risky activities.  Ask for help when climbing stairs.   5. You may feel lightheaded.  IF so, sit for a few minutes before standing.  Have someone help you get up.   6. You may have a slight fever. Call the doctor if your fever is over 100 F (37.7 C) (taken under the tongue) or lasts longer than 24 hours.  7. You may have a dry mouth, a sore throat, muscle aches or trouble sleeping.  These should go away after 24 hours.  8. Do not make important or legal decisions.  Based on the surgery/procedure that you had today, we do not anticipate that you will have any problems.  However, given the various responses that patients can have to the surgical experience, we want to ensure that you have information available to manage pain or nausea and what to do if you observe bleeding or you develop any signs and symptoms of  infection:  Methods to control pain include:  Prescription pain medication or over the counter medications as prescribed or suggested by your physician.  In addition, ice packs and periods of rest are often helpful.  If your pain is not managed with the above methods, contact your physician.  Methods to control nausea include:  Anti-nausea medication approved by your physician.  Drink clear liquids such as apple juice, ginger ale, broth or 7-Up. Be sure to drink enough fluids.  Move to a regular diet as you feel able.  Rest may also help.  Bleeding:  It's not uncommon to see a little blood staining on the dressing, about the size of a quarter in the first 24 hours; if you see this, there is no reason to be alarmed.  However, should this continue to increase in size, apply pressure if able, ant notify your physician.  Infection:  We do not anticipate that you will acquire an infection, but if you should experience any of the following symptoms:  redness, swelling, heat, increasing pain or abnormal drainage at your surgery site, fever or chills, please notify your physician.    Call your doctor for any of the followin.  Signs of infection (fever, growing tenderness at the surgery site, a large amount of drainage or bleeding, severe pain, foul-smelling drainage, redness, swelling).    2. It has been over 8 to 10 hours since surgery and you are still not able to urinate (pass water).    3.  Headache for over 24 hours.      Nurse advice line: [phone]        Pending Results     Date and Time Order Name Status Description    2018 1204 Anaerobic bacterial culture In process     2018 1204 Abscess Culture Aerobic Bacterial In process     2018 0913 Blood culture In process     2018 0841 Blood culture In process     2018 0841 Urine Culture In process             Admission Information     Date & Time Department Dept. Phone    2018 [facility] Phase [phone]      Your Vitals  "Were     Blood Pressure Pulse Temperature Respirations Height Weight    122/85 79 97.6  F (36.4  C) (Oral) 14 1.626 m (5' 4\") 100.7 kg (222 lb)    Last Period Pulse Oximetry BMI (Body Mass Index)             06/12/2014 96% 38.11 kg/m2         [employer] Information     [facility] [employer] [facility] gives you secure access to your electronic health record. If you see a primary care provider, you can also send messages to your care team and make appointments. If you have questions, please call your primary care clinic.  If you do not have a primary care provider, please call [phone] and they will assist you.        Care EveryWhere ID     This is your Care EveryWhere ID. This could be used by other organizations to access your [facility] medical records  [id number]        Equal Access to Services     THIERRY CHEN : Dona Vega, swati rivera, enmanuel whalen, shlomo haddad . So [facility] [phone].    ATENCIÓN: Si habla español, tiene a alexandre disposición servicios gratuitos de asistencia lingüística. Llame al [phone].    We comply with applicable federal civil rights laws and [place] laws. We do not discriminate on the basis of race, color, national origin, age, disability, sex, sexual orientation, or gender identity.               Review of your medicines      START taking        Dose / Directions    amoxicillin-clavulanate 875-125 MG per tablet   Commonly known as:  AUGMENTIN   Used for:  Urinary tract infection without hematuria, site unspecified        Dose:  1 tablet   Take 1 tablet by mouth 2 times daily   Quantity:  20 tablet   Refills:  0       oxyCODONE IR 5 MG tablet   Commonly known as:  ROXICODONE   Used for:  Post-op pain        Dose:  5-10 mg   Take 1-2 tablets (5-10 mg) by mouth every 3 hours as needed for pain or other (Moderate to Severe)   Quantity:  20 tablet   Refills:  0         CONTINUE these medicines which have NOT CHANGED        Dose / Directions    " acetaminophen 500 MG tablet   Commonly known as:  TYLENOL   Used for:  Acute pyelonephritis        Dose:  500-1000 mg   Take 1-2 tablets (500-1,000 mg) by mouth every 6 hours as needed   Refills:  0       ALBUTEROL INHALER 17GM   Used for:  Intermittent asthma        Dose:  2 puff   Inhale 2 puffs into the lungs every 4 hours as needed For shortness of breath   Refills:  0       ALEVE PO   Indication:  migraines        Dose:  660 mg   Take 660 mg by mouth 2 times daily (with meals)   Refills:  0       calcium carbonate 500 MG chewable tablet   Commonly known as:  TUMS   Used for:  GERD (gastroesophageal reflux disease)        Dose:  1 chew tab   Take 1 tablet (500 mg) by mouth 3 times daily as needed For heartburn   Quantity:  90 chew tab   Refills:  0       fexofenadine 180 MG tablet   Commonly known as:  ALLEGRA        Dose:  180 mg   Take 1 tablet by mouth daily as needed.   Refills:  0       IRON SUPPLEMENT PO        Dose:  650 mg   Take 650 mg by mouth At Bedtime   Refills:  0       LIPITOR PO   Indication:  Type II A Hyperlipidemia        Dose:  40 mg   Take 40 mg by mouth daily. Indications: Type II A Hyperlipidemia   Refills:  0       METFORMIN HCL PO        Dose:  500 mg   Take 500 mg by mouth 2 times daily (with meals)   Refills:  0       NASONEX 50 MCG/ACT spray   Generic drug:  mometasone        AS NEEDED   Refills:  0       PROTONIX 40 MG EC tablet   Used for:  GERD (gastroesophageal reflux disease)   Generic drug:  pantoprazole        Dose:  40 mg   Take 1 tablet (40 mg) by mouth 2 times daily   Quantity:  30 tablet   Refills:  0       VITAMIN D (CHOLECALCIFEROL) PO        Dose:  400 Units   Take 400 Units by mouth   Refills:  0       VITAMIN E COMPLEX PO        Dose:  1000 Units   Take 1,000 Units by mouth daily   Refills:  0       ZANTAC 150 MG tablet   Used for:  GERD (gastroesophageal reflux disease)   Generic drug:  ranitidine        Dose:  150 mg   Take 1 tablet (150 mg) by mouth daily as needed  for heartburn   Quantity:  60 tablet   Refills:  0            Where to get your medicines      Some of these will need a paper prescription and others can be bought over the counter. Ask your nurse if you have questions.     Bring a paper prescription for each of these medications     amoxicillin-clavulanate 875-125 MG per tablet    oxyCODONE IR 5 MG tablet                Protect others around you: Learn how to safely use, store and throw away your medicines at www.disposemymeds.org.        ANTIBIOTIC INSTRUCTION     You've Been Prescribed an Antibiotic - Now What?  Your healthcare team thinks that you or your loved one might have an infection. Some infections can be treated with antibiotics, which are powerful, life-saving drugs. Like all medications, antibiotics have side effects and should only be used when necessary. There are some important things you should know about your antibiotic treatment.      Your healthcare team may run tests before you start taking an antibiotic.    Your team may take samples (e.g., from your blood, urine or other areas) to run tests to look for bacteria. These test can be important to determine if you need an antibiotic at all and, if you do, which antibiotic will work best.      Within a few days, your healthcare team might change or even stop your antibiotic.    Your team may start you on an antibiotic while they are working to find out what is making you sick.    Your team might change your antibiotic because test results show that a different antibiotic would be better to treat your infection.    In some cases, once your team has more information, they learn that you do not need an antibiotic at all. They may find out that you don't have an infection, or that the antibiotic you're taking won't work against your infection. For example, an infection caused by a virus can't be treated with antibiotics. Staying on an antibiotic when you don't need it is more likely to be harmful than  helpful.      You may experience side effects from your antibiotic.    Like all medications, antibiotics have side effects. Some of these can be serious.    Let you healthcare team know if you have any known allergies when you are admitted to the hospital.    One significant side effect of nearly all antibiotics is the risk of severe and sometimes deadly diarrhea caused by Clostridium difficile (C. Difficile). This occurs when a person takes antibiotics because some good germs are destroyed. Antibiotic use allows C. diificile to take over, putting patients at high risk for this serious infection.    As a patient or caregiver, it is important to understand your or your loved one's antibiotic treatment. It is especially important for caregivers to speak up when patients can't speak for themselves. Here are some important questions to ask your healthcare team.    What infection is this antibiotic treating and how do you know I have that infection?    What side effects might occur from this antibiotic?    How long will I need to take this antibiotic?    Is it safe to take this antibiotic with other medications or supplements (e.g., vitamins) that I am taking?     Are there any special directions I need to know about taking this antibiotic? For example, should I take it with food?    How will I be monitored to know whether my infection is responding to the antibiotic?    What tests may help to make sure the right antibiotic is prescribed for me?      Information provided by:  www.cdc.gov/getsmart  U.S. Department of Health and Human Services  Centers for disease Control and Prevention  National Center for Emerging and Zoonotic Infectious Diseases  Division of Healthcare Quality Promotion        Information about OPIOIDS     PRESCRIPTION OPIOIDS: WHAT YOU NEED TO KNOW   We gave you an opioid (narcotic) pain medicine. It is important to manage your pain, but opioids are not always the best choice. You should first try all  the other options your care team gave you. Take this medicine for as short a time (and as few doses) as possible.     These medicines have risks:    DO NOT drive when on new or higher doses of pain medicine. These medicines can affect your alertness and reaction times, and you could be arrested for driving under the influence (DUI). If you need to use opioids long-term, talk to your care team about driving.    DO NOT operate heave machinery    DO NOT do any other dangerous activities while taking these medicines.     DO NOT drink any alcohol while taking these medicines.      If the opioid prescribed includes acetaminophen, DO NOT take with any other medicines that contain acetaminophen. Read all labels carefully. Look for the word  acetaminophen  or  Tylenol.  Ask your pharmacist if you have questions or are unsure.    You can get addicted to pain medicines, especially if you have a history of addiction (chemical, alcohol or substance dependence). Talk to your care team about ways to reduce this risk.    Store your pills in a secure place, locked if possible. We will not replace any lost or stolen medicine. If you don t finish your medicine, please throw away (dispose) as directed by your pharmacist. The [place] Pollution Control Agency has more information about safe disposal: https://www.pca.[facility].[place].us/living-green/managing-unwanted-medications.     All opioids tend to cause constipation. Drink plenty of water and eat foods that have a lot of fiber, such as fruits, vegetables, prune juice, apple juice and high-fiber cereal. Take a laxative (Miralax, milk of magnesia, Colace, Senna) if you don t move your bowels at least every other day.              Medication List: This is a list of all your medications and when to take them. Check marks below indicate your daily home schedule. Keep this list as a reference.      Medications           Morning Afternoon Evening Bedtime As Needed    acetaminophen 500 MG tablet    Commonly known as:  TYLENOL   Take 1-2 tablets (500-1,000 mg) by mouth every 6 hours as needed                                ALBUTEROL INHALER 17GM   Inhale 2 puffs into the lungs every 4 hours as needed For shortness of breath                                ALEVE PO   Take 660 mg by mouth 2 times daily (with meals)                                amoxicillin-clavulanate 875-125 MG per tablet   Commonly known as:  AUGMENTIN   Take 1 tablet by mouth 2 times daily                                calcium carbonate 500 MG chewable tablet   Commonly known as:  TUMS   Take 1 tablet (500 mg) by mouth 3 times daily as needed For heartburn                                fexofenadine 180 MG tablet   Commonly known as:  ALLEGRA   Take 1 tablet by mouth daily as needed.                                IRON SUPPLEMENT PO   Take 650 mg by mouth At Bedtime                                LIPITOR PO   Take 40 mg by mouth daily. Indications: Type II A Hyperlipidemia                                METFORMIN HCL PO   Take 500 mg by mouth 2 times daily (with meals)                                NASONEX 50 MCG/ACT spray   AS NEEDED   Generic drug:  mometasone                                oxyCODONE IR 5 MG tablet   Commonly known as:  ROXICODONE   Take 1-2 tablets (5-10 mg) by mouth every 3 hours as needed for pain or other (Moderate to Severe)                                PROTONIX 40 MG EC tablet   Take 1 tablet (40 mg) by mouth 2 times daily   Generic drug:  pantoprazole                                VITAMIN D (CHOLECALCIFEROL) PO   Take 400 Units by mouth                                VITAMIN E COMPLEX PO   Take 1,000 Units by mouth daily                                ZANTAC 150 MG tablet   Take 1 tablet (150 mg) by mouth daily as needed for heartburn   Generic drug:  ranitidine                                          More Information        Perianal Abscess, Incision and Drainage  Glands near the anus can become blocked. This  can lead to infection. If the infection cannot drain, a collection of pus called an abscess may form. Symptoms of an abscess include pain, itching, swelling, and fever.  Treatment of this infection has required an incision to drain the pus from the abscess. A gauze packing may have been put into the abscess opening. This should be removed within 1-2 days. if it falls out sooner, do not try to put it back in. Treatment with antibiotics may or may not be needed.  Healing of the wound will take about 1 to 2 weeks, depending on the size of the abscess.  Home care    The wound may drain for the first several days. Cover the wound with a clean dry bandage. Change the dressing if it becomes soaked with blood or pus, or soiled with feces.    If gauze packing was placed inside the abscess cavity, you may be told to remove it yourself. Do this only do it if the doctor told you to. You may do this in the shower. Once the packing is removed, wash the area carefully once a day until the skin opening has closed.     Try sitz baths. Sit in a tub filled with about 6 inches of hot water for 15-30 minutes. (Test the water temperature before sitting down to ensure it will not burn you.) Repeat this twice a day until pain is relieved.    If you were prescribed antibiotics, take all of the medicine as prescribed. Continue it even if you start feeling better. All of the medicine should be finished unless your healthcare provider tells you to stop.    Unless a pain medicine has been prescribed, you may take an over-the-counter medicine, such as ibuprofen, for pain.    Passing stools may be painful. If so, ask your healthcare provider about using a stool-softener for a short time.  Follow-up care  Follow up with your healthcare provider as advised by our staff.  When to seek medical advice  Call your health care provider if any of the following occur:    Increasing pain, swelling, or redness    Pus continuing to drain from the wound 5 days  after the incision    Fever of 100.4 F (38 C) or higher, or as directed by your healthcare provider  Date Last Reviewed: 6/22/2015 2000-2017 [employer]. [address] [place] [address], [place] [address]. All rights reserved. This information is not intended as a substitute for professional medical care. Always follow your healthcare professional's instructions.

## 2018-06-12 NOTE — OP NOTE
Procedure Date: 06/12/2018      PREOPERATIVE DIAGNOSIS:  Perirectal abscess.      POSTOPERATIVE DIAGNOSIS:  Left buttock abscess.      OPERATIVE PROCEDURES:     1.  Rectal exam under anesthesia.   2.  Incision and drainage of left buttock abscess.      SURGEON:  Tony Roberts MD, [facility]      ANESTHESIA:  LMA.      INDICATIONS FOR PROCEDURE:  This is a 52-year-old lady who while sitting on the floor changing a litter box developed left buttock pain.  She looked yesterday and attempted self-drainage with pressure thinking it was just a small boil.  The area suddenly enlarged overnight, and she became concerned and presented to the ER this morning.  She was seen by the ER physician who was concerned she might have a perirectal abscess versus a buttock abscess, and for this and after I examined her, it was in agreement, and it was elected to take her to the operating room for a rectal exam under anesthesia and drainage of abscess.      OPERATIVE FINDINGS:  A 3 x 3 x 4 cm indurated area of the left buttock.  No communication with the rectum.  There was a small amount of pus when it was opened.      DETAILS OF PROCEDURE:  The patient was taken to the operating room and placed on the table in supine position.  After induction of anesthesia, she was placed in lithotomy, and the left buttock and rectum were prepped and draped in sterile fashion.  A timeout was performed confirming the identity of the patient, as well as the procedure to be performed.  A digital rectal exam was then carried out.  There were no masses noted.  A Gann retractor was inserted.  We inspected the entire rectum, and there was no evidence of any cavity connecting this indurated area to the rectum, and there was no internal opening, and we could not express any pus.  This area was approximately 4 cm lateral to the rectum, and there appeared to be no communication.  So, there was a small area of fluctuance felt in the center of this indurated area,  and then, a cruciate incision was then made.  A small amount of pus came out.  A suction catheter was placed inside and opened up the entire area, and there were no further loculations or pus drained.  This pus was sent for culture.  The area was copiously irrigated.  Local anesthetic was placed.  It was then packed with Betadine-soaked gauze.  A sterile dressing was applied, and the patient was taken from the operating room to the recovery room in stable condition to be sent home.  She will be placed on oral antibiotics for a presumed UTI, and the coverage will cover any possible microbes in the abscess cavity itself as well.         DAVE SANTOS MD, FACS             D: 2018   T: 2018   MT: BISHOP      Name:     MICHELLE NAVARRO   MRN:      [id number]        Account:        [id number]   :      1965           Procedure Date: 2018      Document: [id number]

## 2018-06-12 NOTE — IP AVS SNAPSHOT
[facility] Phase II    [address]     [place] [address]    Phone:  [phone]                                       After Visit Summary   6/12/2018    Chandni Javier    MRN: [id number]           After Visit Summary Signature Page     I have received my discharge instructions, and my questions have been answered. I have discussed any challenges I see with this plan with the nurse or doctor.    ..........................................................................................................................................  Patient/Patient Representative Signature      ..........................................................................................................................................  Patient Representative Print Name and Relationship to Patient    ..................................................               ................................................  Date                                            Time    ..........................................................................................................................................  Reviewed by Signature/Title    ...................................................              ..............................................  Date                                                            Time

## 2018-06-12 NOTE — DISCHARGE INSTRUCTIONS
DISCHARGE INSTRUCTIONS FOR PATIENT   SCOPOLAMINE TRANSDERMAL PATCH  You may leave the patch on behind your ear for three days, but NO LONGER. You may have withdrawal symptoms (nausea, vomiting, headache, dizziness) if used longer.  When you remove the patch, you may wash and dry your hands thoroughly and before touching your eyes, as pupil may dilate.  Discard patch (away from children and pets).  You may develop some urinary hesitancy or urine retention.    Same-Day Surgery   Adult Discharge Orders & Instructions - After Anesthesia    For 24 hours after surgery    1. Get plenty of rest.  A responsible adult must stay with you for at least 24 hours after you leave the hospital.   2. Do not drive or use heavy equipment.  If you have weakness or tingling, don't drive or use heavy equipment until this feeling goes away.  3. Do not drink alcohol.  4. Avoid strenuous or risky activities.  Ask for help when climbing stairs.   5. You may feel lightheaded.  IF so, sit for a few minutes before standing.  Have someone help you get up.   6. You may have a slight fever. Call the doctor if your fever is over 100 F (37.7 C) (taken under the tongue) or lasts longer than 24 hours.  7. You may have a dry mouth, a sore throat, muscle aches or trouble sleeping.  These should go away after 24 hours.  8. Do not make important or legal decisions.  Based on the surgery/procedure that you had today, we do not anticipate that you will have any problems.  However, given the various responses that patients can have to the surgical experience, we want to ensure that you have information available to manage pain or nausea and what to do if you observe bleeding or you develop any signs and symptoms of infection:  Methods to control pain include:  Prescription pain medication or over the counter medications as prescribed or suggested by your physician.  In addition, ice packs and periods of rest are often helpful.  If your pain is not managed  with the above methods, contact your physician.  Methods to control nausea include:  Anti-nausea medication approved by your physician.  Drink clear liquids such as apple juice, ginger ale, broth or 7-Up. Be sure to drink enough fluids.  Move to a regular diet as you feel able.  Rest may also help.  Bleeding:  It's not uncommon to see a little blood staining on the dressing, about the size of a quarter in the first 24 hours; if you see this, there is no reason to be alarmed.  However, should this continue to increase in size, apply pressure if able, ant notify your physician.  Infection:  We do not anticipate that you will acquire an infection, but if you should experience any of the following symptoms:  redness, swelling, heat, increasing pain or abnormal drainage at your surgery site, fever or chills, please notify your physician.    Call your doctor for any of the followin.  Signs of infection (fever, growing tenderness at the surgery site, a large amount of drainage or bleeding, severe pain, foul-smelling drainage, redness, swelling).    2. It has been over 8 to 10 hours since surgery and you are still not able to urinate (pass water).    3.  Headache for over 24 hours.      Nurse advice line: [phone]

## 2018-06-13 LAB
BACTERIA SPEC CULT: NORMAL
BACTERIA SPEC CULT: NORMAL
Lab: NORMAL
SPECIMEN SOURCE: NORMAL

## 2018-06-13 NOTE — PROGRESS NOTES
Safe Accounts (purposefully retained items) Examples: BELGICA's, Hemovac, penrose,Wound packing, Ureteral stents, Moreno, PIV/Picc Line    We care about the coordination of your care  1. Do you still have packing in place? Yes, but had to re-pack last evening because it was draining so much.  2. If the item is in place, Can you review the plan for removal with me? Re-pack wound x2/daily as necessary.

## 2018-06-15 LAB
BACTERIA SPEC CULT: ABNORMAL
BACTERIA SPEC CULT: ABNORMAL
Lab: ABNORMAL
Lab: ABNORMAL
SPECIMEN SOURCE: ABNORMAL
SPECIMEN SOURCE: ABNORMAL

## 2018-06-18 ENCOUNTER — OFFICE VISIT (OUTPATIENT)
Dept: SURGERY | Facility: CLINIC | Age: 53
End: 2018-06-18
Payer: COMMERCIAL

## 2018-06-18 VITALS
SYSTOLIC BLOOD PRESSURE: 132 MMHG | BODY MASS INDEX: 37.95 KG/M2 | OXYGEN SATURATION: 96 % | DIASTOLIC BLOOD PRESSURE: 62 MMHG | WEIGHT: 221.1 LBS | HEART RATE: 121 BPM | TEMPERATURE: 97.1 F

## 2018-06-18 DIAGNOSIS — Z98.890 POST-OPERATIVE STATE: Primary | ICD-10-CM

## 2018-06-18 LAB
BACTERIA SPEC CULT: NORMAL
BACTERIA SPEC CULT: NORMAL
Lab: NORMAL
Lab: NORMAL
SPECIMEN SOURCE: NORMAL
SPECIMEN SOURCE: NORMAL

## 2018-06-18 PROCEDURE — 99024 POSTOP FOLLOW-UP VISIT: CPT | Performed by: SPECIALIST

## 2018-06-18 NOTE — LETTER
6/18/2018         RE: Chandni Javier  [address]  [facility] [address]        Dear Colleague,    Thank you for referring your patient, Chandni Javier, to the [facility]. Please see a copy of my visit note below.    F/U for I&D buttock abscess      Subjective:  Pt feels good.  No complaints.    Objective:  B/P: 132/62, T: 97.1, P: 121, R: Data Unavailable  Buttock: Wound clean - 100% granulation      Assessment/plan:  Pt s/p I&D of buttock abscess.  Wound clean.  Finish abx.  Cont local care.  F/U 2 weeks for wound check.    Tony Roberts MD, FACS    Again, thank you for allowing me to participate in the care of your patient.        Sincerely,        Tony Roberts MD

## 2018-06-18 NOTE — NURSING NOTE
"Chandni Javier is a 52 year old female who presents for:  Chief Complaint   Patient presents with     Surgical Followup     excision of perirectal abscess  DOS 6-        Initial Vitals:  /62 (BP Location: Right arm, Patient Position: Sitting, Cuff Size: Adult Large)  Pulse 121  Temp 97.1  F (36.2  C) (Temporal)  Wt 100.3 kg (221 lb 1.6 oz)  LMP 06/12/2014  SpO2 96%  BMI 37.95 kg/m2 Estimated body mass index is 37.95 kg/(m^2) as calculated from the following:    Height as of 6/12/18: 1.626 m (5' 4\").    Weight as of this encounter: 100.3 kg (221 lb 1.6 oz).. Body surface area is 2.13 meters squared. BP completed using cuff size: large  Data Unavailable    Do you feel safe in your environment?  Yes  Do you need any refills today? No    Nursing Comments:         Fauzia Waters  "

## 2018-06-18 NOTE — PROGRESS NOTES
F/U for I&D buttock abscess      Subjective:  Pt feels good.  No complaints.    Objective:  B/P: 132/62, T: 97.1, P: 121, R: Data Unavailable  Buttock: Wound clean - 100% granulation      Assessment/plan:  Pt s/p I&D of buttock abscess.  Wound clean.  Finish abx.  Cont local care.  F/U 2 weeks for wound check.    Tony Roberts MD, FACS

## 2018-06-18 NOTE — LETTER
[address] [address]  Phone: [phone]        June 18, 2018      RE: Chandni MCMAHON Yaya    To whom it may concern:    Chandni Javier was seen at a [facility] facility today.  She will be out of work until July 2, 2018, at that time she will be re-evaluated.      Any questions please feel free to call me.        Tony Roberts M.D. [facility]  [phone]

## 2018-06-18 NOTE — MR AVS SNAPSHOT
After Visit Summary   6/18/2018    Chandni Javier    MRN: [id number]           Patient Information     Date Of Birth          1965        Visit Information        Provider Department      6/18/2018 2:30 PM Tony Roberts MD [facility]        Today's Diagnoses     Post-operative state    -  1       Follow-ups after your visit        Who to contact     If you have questions or need follow up information about today's clinic visit or your schedule please contact [facility] directly at [phone].  Normal or non-critical lab and imaging results will be communicated to you by Appianhart, letter or phone within 4 business days after the clinic has received the results. If you do not hear from us within 7 days, please contact the clinic through [employer]acon Read[employer] or phone. If you have a critical or abnormal lab result, we will notify you by phone as soon as possible.  Submit refill requests through [employer] or call your pharmacy and they will forward the refill request to us. Please allow 3 business days for your refill to be completed.          Additional Information About Your Visit        MyChart Information     [employer] gives you secure access to your electronic health record. If you see a primary care provider, you can also send messages to your care team and make appointments. If you have questions, please call your primary care clinic.  If you do not have a primary care provider, please call [phone] and they will assist you.        Care EveryWhere ID     This is your Care EveryWhere ID. This could be used by other organizations to access your [facility] medical records  [id number]        Your Vitals Were     Pulse Temperature Last Period Pulse Oximetry BMI (Body Mass Index)       121 97.1  F (36.2  C) (Temporal) 06/12/2014 96% 37.95 kg/m2        Blood Pressure from Last 3 Encounters:   06/18/18 132/62   06/12/18 116/64   03/23/17 156/89    Weight from Last 3  Encounters:   06/18/18 100.3 kg (221 lb 1.6 oz)   06/12/18 100.7 kg (222 lb)   01/02/17 100.9 kg (222 lb 6.4 oz)              Today, you had the following     No orders found for display       Primary Care Provider Office Phone # Fax #    Rubens Calle [phone]       [place] [facility] [address]  [facility] [facility] [place] [address]        Equal Access to Services     THIERRY CHEN : Hadii aad ku hadasho Soomaali, waaxda luqadaha, qaybta kaalmada adeegyada, waxay idiin hayaan adeeg kharanorma lasharon . So [facility] [phone].    ATENCIÓN: Si habla español, tiene a alexandre disposición servicios gratuitos de asistencia lingüística. [facility] [phone].    We comply with applicable federal civil rights laws and [place] laws. We do not discriminate on the basis of race, color, national origin, age, disability, sex, sexual orientation, or gender identity.            Thank you!     Thank you for choosing [facility]  for your care. Our goal is always to provide you with excellent care. Hearing back from our patients is one way we can continue to improve our services. Please take a few minutes to complete the written survey that you may receive in the mail after your visit with us. Thank you!             Your Updated Medication List - Protect others around you: Learn how to safely use, store and throw away your medicines at www.disposemymeds.org.          This list is accurate as of 6/18/18  2:36 PM.  Always use your most recent med list.                   Brand Name Dispense Instructions for use Diagnosis    acetaminophen 500 MG tablet    TYLENOL     Take 1-2 tablets (500-1,000 mg) by mouth every 6 hours as needed    Acute pyelonephritis       ALBUTEROL INHALER 17GM      Inhale 2 puffs into the lungs every 4 hours as needed For shortness of breath    Intermittent asthma       ALEVE PO      Take 660 mg by mouth 2 times daily (with meals)        amoxicillin-clavulanate 875-125 MG per tablet     AUGMENTIN    20 tablet    Take 1 tablet by mouth 2 times daily    Urinary tract infection without hematuria, site unspecified       calcium carbonate 500 MG chewable tablet    TUMS    90 chew tab    Take 1 tablet (500 mg) by mouth 3 times daily as needed For heartburn    GERD (gastroesophageal reflux disease)       fexofenadine 180 MG tablet    ALLEGRA     Take 1 tablet by mouth daily as needed.        IRON SUPPLEMENT PO      Take 650 mg by mouth At Bedtime        LIPITOR PO      Take 40 mg by mouth daily. Indications: Type II A Hyperlipidemia        METFORMIN HCL PO      Take 500 mg by mouth 2 times daily (with meals)        NASONEX 50 MCG/ACT spray   Generic drug:  mometasone      AS NEEDED        oxyCODONE IR 5 MG tablet    ROXICODONE    20 tablet    Take 1-2 tablets (5-10 mg) by mouth every 3 hours as needed for pain or other (Moderate to Severe)    Post-op pain       PROTONIX 40 MG EC tablet   Generic drug:  pantoprazole     30 tablet    Take 1 tablet (40 mg) by mouth 2 times daily    GERD (gastroesophageal reflux disease)       VITAMIN D (CHOLECALCIFEROL) PO      Take 400 Units by mouth        VITAMIN E COMPLEX PO      Take 1,000 Units by mouth daily        ZANTAC 150 MG tablet   Generic drug:  ranitidine     60 tablet    Take 1 tablet (150 mg) by mouth daily as needed for heartburn    GERD (gastroesophageal reflux disease)

## 2018-06-21 ENCOUNTER — TELEPHONE (OUTPATIENT)
Dept: SURGERY | Facility: CLINIC | Age: 53
End: 2018-06-21

## 2018-07-02 ENCOUNTER — OFFICE VISIT (OUTPATIENT)
Dept: SURGERY | Facility: CLINIC | Age: 53
End: 2018-07-02
Payer: COMMERCIAL

## 2018-07-02 VITALS
SYSTOLIC BLOOD PRESSURE: 142 MMHG | DIASTOLIC BLOOD PRESSURE: 88 MMHG | OXYGEN SATURATION: 96 % | WEIGHT: 219.8 LBS | HEART RATE: 121 BPM | BODY MASS INDEX: 37.73 KG/M2 | TEMPERATURE: 97.6 F

## 2018-07-02 DIAGNOSIS — Z98.890 POST-OPERATIVE STATE: Primary | ICD-10-CM

## 2018-07-02 PROCEDURE — 99024 POSTOP FOLLOW-UP VISIT: CPT | Performed by: SPECIALIST

## 2018-07-02 NOTE — LETTER
7/2/2018         RE: Chandni Javier  [address]  [place] [address]        Dear Colleague,    Thank you for referring your patient, Chandni Javier, to the [facility]. Please see a copy of my visit note below.    F/U for I&D buttock abscess      Subjective:  Pt feels good.  No complaints.    Objective:  B/P: 142/88, T: 97.6, P: 121, R: Data Unavailable  Buttock: Wound clean - 98% granulation    Assessment/plan:  Pt s/p I&D of buttock abscess.  Wound clean.  Cont local care.  F/U 2 weeks for wound check if remains open.    Patient to follow up with Primary Care provider regarding elevated blood pressure.    Tony Roberts MD, FACS    Again, thank you for allowing me to participate in the care of your patient.        Sincerely,        Tony Roberts MD

## 2018-07-02 NOTE — NURSING NOTE
"Chandni Javier is a 52 year old female who presents for:  Chief Complaint   Patient presents with     RECHECK     F/U for I&D buttock abscess        Initial Vitals:  /88 (BP Location: Right arm, Patient Position: Sitting, Cuff Size: Adult Large)  Pulse 121  Temp 97.6  F (36.4  C) (Temporal)  Wt 99.7 kg (219 lb 12.8 oz)  LMP 06/12/2014  SpO2 96%  BMI 37.73 kg/m2 Estimated body mass index is 37.73 kg/(m^2) as calculated from the following:    Height as of 6/12/18: 1.626 m (5' 4\").    Weight as of this encounter: 99.7 kg (219 lb 12.8 oz).. Body surface area is 2.12 meters squared. BP completed using cuff size: large  Data Unavailable    Do you feel safe in your environment?  Yes  Do you need any refills today? No    Nursing Comments:         Fauzia Waters  "

## 2018-07-02 NOTE — LETTER
[address] [address]  Phone: [phone]            July 2, 2018      RE: Chandni MCMAHON Yaya    To whom it may concern:    Chandni Javier was seen at a [facility] facility today.    She may return to work on July 9, 2018 with   The following restrictions:    NONE    Any questions please feel free to call me.        Tony Roberts M.D. [facility]  [phone]

## 2018-07-02 NOTE — LETTER
[address] [address]  Phone: [phone]            July 2, 2018      RE: Chandni MCMAHON Yaya    To whom it may concern:    Chandni Javier was seen at a [facility] facility today.    She may return to work on July 3, 2018 with   The following restrictions:  NONE  Any questions please feel free to call me.        Tony Roberts M.D.[facility]   [phone]

## 2018-07-02 NOTE — MR AVS SNAPSHOT
After Visit Summary   7/2/2018    Chandni Javier    MRN: [id number]           Patient Information     Date Of Birth          1965        Visit Information        Provider Department      7/2/2018 2:45 PM Tony Roberts MD [facility]        Today's Diagnoses     Post-operative state    -  1       Follow-ups after your visit        Who to contact     If you have questions or need follow up information about today's clinic visit or your schedule please contact [facility] directly at [phone].  Normal or non-critical lab and imaging results will be communicated to you by PathSourcehart, letter or phone within 4 business days after the clinic has received the results. If you do not hear from us within 7 days, please contact the clinic through [employer] or phone. If you have a critical or abnormal lab result, we will notify you by phone as soon as possible.  Submit refill requests through [employer] or call your pharmacy and they will forward the refill request to us. Please allow 3 business days for your refill to be completed.          Additional Information About Your Visit        MyChart Information     [employer] gives you secure access to your electronic health record. If you see a primary care provider, you can also send messages to your care team and make appointments. If you have questions, please call your primary care clinic.  If you do not have a primary care provider, please call [phone] and they will assist you.        Care EveryWhere ID     This is your Care EveryWhere ID. This could be used by other organizations to access your [facility] medical records  [id number]        Your Vitals Were     Pulse Temperature Last Period Pulse Oximetry BMI (Body Mass Index)       121 97.6  F (36.4  C) (Temporal) 06/12/2014 96% 37.73 kg/m2        Blood Pressure from Last 3 Encounters:   07/02/18 142/88   06/18/18 132/62   06/12/18 116/64    Weight from Last 3  Encounters:   07/02/18 99.7 kg (219 lb 12.8 oz)   06/18/18 100.3 kg (221 lb 1.6 oz)   06/12/18 100.7 kg (222 lb)              Today, you had the following     No orders found for display       Primary Care Provider Office Phone # Fax #    Rubens Calle [phone]       [place] [facility] [address]  [place] [address]        Equal Access to Services     THIERRY CHEN : Hadii aad ku hadasho Soomaali, waaxda luqadaha, qaybta kaalmada adeegyada, waxay idiin hayaan adeeg kharash lasharon . So [facility] [phone].    ATENCIÓN: Si habla español, tiene a alexandre disposición servicios gratuitos de asistencia lingüística. [facility] [phone].    We comply with applicable federal civil rights laws and [place] laws. We do not discriminate on the basis of race, color, national origin, age, disability, sex, sexual orientation, or gender identity.            Thank you!     Thank you for choosing [facility]  for your care. Our goal is always to provide you with excellent care. Hearing back from our patients is one way we can continue to improve our services. Please take a few minutes to complete the written survey that you may receive in the mail after your visit with us. Thank you!             Your Updated Medication List - Protect others around you: Learn how to safely use, store and throw away your medicines at www.disposemymeds.org.          This list is accurate as of 7/2/18  2:50 PM.  Always use your most recent med list.                   Brand Name Dispense Instructions for use Diagnosis    acetaminophen 500 MG tablet    TYLENOL     Take 1-2 tablets (500-1,000 mg) by mouth every 6 hours as needed    Acute pyelonephritis       ALBUTEROL INHALER 17GM      Inhale 2 puffs into the lungs every 4 hours as needed For shortness of breath    Intermittent asthma       ALEVE PO      Take 660 mg by mouth 2 times daily (with meals)        amoxicillin-clavulanate 875-125 MG per tablet     AUGMENTIN    20 tablet    Take 1 tablet by mouth 2 times daily    Urinary tract infection without hematuria, site unspecified       calcium carbonate 500 MG chewable tablet    TUMS    90 chew tab    Take 1 tablet (500 mg) by mouth 3 times daily as needed For heartburn    GERD (gastroesophageal reflux disease)       fexofenadine 180 MG tablet    ALLEGRA     Take 1 tablet by mouth daily as needed.        IRON SUPPLEMENT PO      Take 650 mg by mouth At Bedtime        LIPITOR PO      Take 40 mg by mouth daily. Indications: Type II A Hyperlipidemia        METFORMIN HCL PO      Take 500 mg by mouth 2 times daily (with meals)        NASONEX 50 MCG/ACT spray   Generic drug:  mometasone      AS NEEDED        oxyCODONE IR 5 MG tablet    ROXICODONE    20 tablet    Take 1-2 tablets (5-10 mg) by mouth every 3 hours as needed for pain or other (Moderate to Severe)    Post-op pain       PROTONIX 40 MG EC tablet   Generic drug:  pantoprazole     30 tablet    Take 1 tablet (40 mg) by mouth 2 times daily    GERD (gastroesophageal reflux disease)       VITAMIN D (CHOLECALCIFEROL) PO      Take 400 Units by mouth        VITAMIN E COMPLEX PO      Take 1,000 Units by mouth daily        ZANTAC 150 MG tablet   Generic drug:  ranitidine     60 tablet    Take 1 tablet (150 mg) by mouth daily as needed for heartburn    GERD (gastroesophageal reflux disease)

## 2018-07-02 NOTE — PROGRESS NOTES
F/U for I&D buttock abscess      Subjective:  Pt feels good.  No complaints.    Objective:  B/P: 142/88, T: 97.6, P: 121, R: Data Unavailable  Buttock: Wound clean - 98% granulation    Assessment/plan:  Pt s/p I&D of buttock abscess.  Wound clean.  Cont local care.  F/U 2 weeks for wound check if remains open.    Patient to follow up with Primary Care provider regarding elevated blood pressure.    Tony Roberts MD, FACS

## 2019-03-05 ENCOUNTER — HOSPITAL ENCOUNTER (EMERGENCY)
Facility: CLINIC | Age: 54
Discharge: HOME | End: 2019-03-05
Attending: EMERGENCY MEDICINE | Admitting: EMERGENCY MEDICINE
Payer: OTHER MISCELLANEOUS

## 2019-03-05 ENCOUNTER — APPOINTMENT (OUTPATIENT)
Dept: GENERAL RADIOLOGY | Facility: CLINIC | Age: 54
End: 2019-03-05
Attending: EMERGENCY MEDICINE
Payer: OTHER MISCELLANEOUS

## 2019-03-05 VITALS
DIASTOLIC BLOOD PRESSURE: 116 MMHG | RESPIRATION RATE: 20 BRPM | BODY MASS INDEX: 37.59 KG/M2 | HEART RATE: 94 BPM | TEMPERATURE: 98.1 F | OXYGEN SATURATION: 99 % | SYSTOLIC BLOOD PRESSURE: 176 MMHG | WEIGHT: 219 LBS

## 2019-03-05 DIAGNOSIS — S16.1XXA STRAIN OF NECK MUSCLE, INITIAL ENCOUNTER: ICD-10-CM

## 2019-03-05 DIAGNOSIS — R07.89 CHEST WALL PAIN: ICD-10-CM
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
## 2019-03-05 PROCEDURE — 72040 X-RAY EXAM NECK SPINE 2-3 VW: CPT | Mod: TC

## 2019-03-05 PROCEDURE — 99284 EMERGENCY DEPT VISIT MOD MDM: CPT | Performed by: EMERGENCY MEDICINE

## 2019-03-05 PROCEDURE — 99284 EMERGENCY DEPT VISIT MOD MDM: CPT | Mod: Z6 | Performed by: EMERGENCY MEDICINE

## 2019-03-05 PROCEDURE — 71101 X-RAY EXAM UNILAT RIBS/CHEST: CPT | Mod: TC,LT

## 2019-03-05 RX ORDER — CYCLOBENZAPRINE HCL 10 MG
10 TABLET ORAL 3 TIMES DAILY PRN
Qty: 30 TABLET | Refills: 0 | Status: SHIPPED | OUTPATIENT
Start: 2019-03-05
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
## 2019-03-05 NOTE — LETTER
March 5, 2019      To Whom It May Concern:      Chandni Javier was seen in our Emergency Department today, 03/05/19.  I expect her condition to improve over the next 2-3 days.  She may return to work when improved.    Sincerely,        Carlostiago Walton MD

## 2019-03-05 NOTE — ED AVS SNAPSHOT
[facility] Emergency Department  [address] [place] [address]  Phone:  [phone]  Fax:  [phone]                                    Chandni Javier   MRN: [id number]    Department:  [facility] Emergency Department   Date of Visit:  3/5/2019           After Visit Summary Signature Page    I have received my discharge instructions, and my questions have been answered. I have discussed any challenges I see with this plan with the nurse or doctor.    ..........................................................................................................................................  Patient/Patient Representative Signature      ..........................................................................................................................................  Patient Representative Print Name and Relationship to Patient    ..................................................               ................................................  Date                                   Time    ..........................................................................................................................................  Reviewed by Signature/Title    ...................................................              ..............................................  Date                                               Time          22EPIC Rev 08/18
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
## 2019-10-14 NOTE — ED NOTES
Pt states daughter has the flu. Having cough, a tickling in her throat starting on Tuesday night,   oral

## 2020-02-24 ENCOUNTER — HEALTH MAINTENANCE LETTER (OUTPATIENT)
Age: 55
End: 2020-02-24

## (undated) DEVICE — ESU GROUND PAD UNIVERSAL W/O CORD

## (undated) DEVICE — GLOVE PROTEXIS W/NEU-THERA 7.5  2D73TE75

## (undated) DEVICE — DRAPE LAP W/ARMBOARD 29410

## (undated) DEVICE — SOL ADH LIQUID BENZOIN SWAB 0.6ML C1544

## (undated) DEVICE — PREP SKIN SCRUB TRAY 4461A

## (undated) DEVICE — DRSG ABDOMINAL 07 1/2X8" 7197D

## (undated) DEVICE — BLADE KNIFE SURG 10 371110

## (undated) DEVICE — Device

## (undated) DEVICE — PACK MINOR PROCEDURE CUSTOM

## (undated) DEVICE — BLADE KNIFE SURG 15 371115

## (undated) RX ORDER — BUPIVACAINE HYDROCHLORIDE AND EPINEPHRINE 2.5; 5 MG/ML; UG/ML
INJECTION, SOLUTION EPIDURAL; INFILTRATION; INTRACAUDAL; PERINEURAL
Status: DISPENSED
Start: 2018-06-12

## (undated) RX ORDER — FENTANYL CITRATE 50 UG/ML
INJECTION, SOLUTION INTRAMUSCULAR; INTRAVENOUS
Status: DISPENSED
Start: 2018-06-12

## (undated) RX ORDER — LIDOCAINE HYDROCHLORIDE AND EPINEPHRINE 10; 10 MG/ML; UG/ML
INJECTION, SOLUTION INFILTRATION; PERINEURAL
Status: DISPENSED
Start: 2018-06-12